# Patient Record
Sex: MALE | Race: WHITE | NOT HISPANIC OR LATINO | Employment: FULL TIME | URBAN - METROPOLITAN AREA
[De-identification: names, ages, dates, MRNs, and addresses within clinical notes are randomized per-mention and may not be internally consistent; named-entity substitution may affect disease eponyms.]

---

## 2023-02-21 ENCOUNTER — OFFICE VISIT (OUTPATIENT)
Dept: URGENT CARE | Facility: CLINIC | Age: 48
End: 2023-02-21

## 2023-02-21 VITALS
WEIGHT: 277 LBS | TEMPERATURE: 98.5 F | OXYGEN SATURATION: 95 % | HEART RATE: 84 BPM | RESPIRATION RATE: 20 BRPM | DIASTOLIC BLOOD PRESSURE: 85 MMHG | HEIGHT: 71 IN | BODY MASS INDEX: 38.78 KG/M2 | SYSTOLIC BLOOD PRESSURE: 130 MMHG

## 2023-02-21 DIAGNOSIS — R05.1 ACUTE COUGH: ICD-10-CM

## 2023-02-21 DIAGNOSIS — H61.23 BILATERAL IMPACTED CERUMEN: ICD-10-CM

## 2023-02-21 DIAGNOSIS — J02.9 SORE THROAT: ICD-10-CM

## 2023-02-21 DIAGNOSIS — J06.9 UPPER RESPIRATORY INFECTION WITH COUGH AND CONGESTION: Primary | ICD-10-CM

## 2023-02-21 DIAGNOSIS — Z09 NEED FOR FOLLOW-UP CARE AFTER DISCHARGE: ICD-10-CM

## 2023-02-21 LAB — S PYO AG THROAT QL: NEGATIVE

## 2023-02-21 RX ORDER — AZITHROMYCIN 250 MG/1
TABLET, FILM COATED ORAL
Qty: 6 TABLET | Refills: 0 | Status: SHIPPED | OUTPATIENT
Start: 2023-02-21 | End: 2023-02-25

## 2023-02-21 RX ORDER — LANSOPRAZOLE 30 MG/1
30 CAPSULE, DELAYED RELEASE ORAL DAILY PRN
COMMUNITY

## 2023-02-21 RX ORDER — CETIRIZINE HYDROCHLORIDE 10 MG/1
10 TABLET ORAL DAILY
COMMUNITY

## 2023-02-21 RX ORDER — FLUTICASONE PROPIONATE 50 MCG
1 SPRAY, SUSPENSION (ML) NASAL DAILY
Qty: 9 ML | Refills: 0 | Status: SHIPPED | OUTPATIENT
Start: 2023-02-21

## 2023-02-21 NOTE — PROGRESS NOTES
[facility]        NAME: Shannon Barney is a 52 y o  male  : 1975    MRN: [id number]  DATE: 2023  TIME: 12:22 PM    Assessment and Plan   Upper respiratory infection with cough and congestion [J06 9]  1  Upper respiratory infection with cough and congestion  azithromycin (ZITHROMAX) 250 mg tablet    fluticasone (FLONASE) 50 mcg/act nasal spray    Ambulatory Referral to [facility]      2  Sore throat  POCT rapid strepA    Throat culture    azithromycin (ZITHROMAX) 250 mg tablet    fluticasone (FLONASE) 50 mcg/act nasal spray    Ambulatory Referral to [facility]      3  Acute cough  Cov/Flu-Collected at [facility]    azithromycin (ZITHROMAX) 250 mg tablet    fluticasone (FLONASE) 50 mcg/act nasal spray    Ambulatory Referral to [facility]      4  Bilateral impacted cerumen  Ambulatory Referral to [facility]      5  Need for follow-up care after discharge  Ambulatory Referral to [facility]            Patient Instructions       Follow up with PCP in 3-5 days  Proceed to  ER if symptoms worsen  Your strep A is negative  You have a throat culture pending  You are to download Crzyfish for the results in 3-4 days  You will be notified if the results are + Azithromycin will cover the strep  You are to do warm salt water gargles 4 x daily  Drink warm tea with honey and lemon  Take tylenol or motrin as able for pain or fever  Chloraseptic throat spray, cough drops  Do not share utensils  Change your tooth brush in 3 days  Follow up with your PCP in 2-3 days  Go to the ED if symptoms worsen      You have an upper respiratory infection with cough  The azithromycin will help loosen up congestion  You are to drink plenty of water  Take Robitussin CF - during the day  Nyquil only at night  Use the flonase for nasal congestion  You have a flu/covid test pending  You are to download urturnt for the results in 24 - 48 hours   You will be notified if abnormal      You have ear wax in both of your ears  You are to see your family doctor for removal or see ENT  You may try the Debrox ear removal system OTC and then flush with a blue bulb syringe with warm water and a little peroxide  Do one ear at a time and complete before starting the other  Chief Complaint     Chief Complaint   Patient presents with   • Sore Throat     Started Sunday, along with a runny nose  • Cough     Trying to bring up phlegm  Has a bad gag reflex  History of Present Illness       This is a 52year old male who states developed a sorethroat and runny nose on Sunday and now has chest congestion  He states he is not able to expectorate mucous  He has had clear runny nose  He denies fevers, chills, n/v/d  He has been taking nyquil only for cough  He denies exposure to strep  He is covid vaccinated but not flu  Review of Systems   Review of Systems   Constitutional: Negative  HENT: Positive for congestion, rhinorrhea and sore throat  Eyes: Negative  Respiratory: Positive for cough  Cardiovascular: Negative  Gastrointestinal: Negative  Endocrine: Negative  Genitourinary: Negative  Musculoskeletal: Negative  Skin: Negative  Allergic/Immunologic: Negative  Neurological: Negative  Hematological: Negative  Psychiatric/Behavioral: Negative            Current Medications       Current Outpatient Medications:   •  azithromycin (ZITHROMAX) 250 mg tablet, Take 2 tablets today then 1 tablet daily x 4 days, Disp: 6 tablet, Rfl: 0  •  cetirizine (ZyrTEC) 10 mg tablet, Take 10 mg by mouth daily, Disp: , Rfl:   •  fluticasone (FLONASE) 50 mcg/act nasal spray, 1 spray into each nostril daily, Disp: 9 mL, Rfl: 0  •  lansoprazole (PREVACID) 30 mg capsule, Take 30 mg by mouth daily as needed, Disp: , Rfl:     Current Allergies     Allergies as of 02/21/2023   • (No Known Allergies)            The following portions of the patient's history were reviewed and updated as appropriate: allergies, current medications, past family history, past medical history, past social history, past surgical history and problem list      History reviewed  No pertinent past medical history  History reviewed  No pertinent surgical history  History reviewed  No pertinent family history  Medications have been verified  Objective   /85 (BP Location: Left arm, Patient Position: Sitting)   Pulse 84   Temp 98 5 °F (36 9 °C) (Temporal)   Resp 20   Ht 5' 11" (1 803 m)   Wt 126 kg (277 lb)   SpO2 95%   BMI 38 63 kg/m²   No LMP for male patient  Physical Exam     Physical Exam  Vitals and nursing note reviewed  Constitutional:       General: He is not in acute distress  Appearance: He is well-developed  He is obese  He is not ill-appearing, toxic-appearing or diaphoretic  HENT:      Head: Normocephalic and atraumatic  Ears:      Comments: Bilateral cerumen impaction      Nose: Rhinorrhea present  No congestion  Mouth/Throat:      Mouth: Mucous membranes are moist  No oral lesions  Pharynx: Oropharynx is clear  Uvula midline  Posterior oropharyngeal erythema present  No pharyngeal swelling, oropharyngeal exudate or uvula swelling  Tonsils: No tonsillar exudate or tonsillar abscesses  1+ on the right  1+ on the left  Eyes:      Extraocular Movements:      Right eye: Normal extraocular motion  Left eye: Normal extraocular motion  Cardiovascular:      Rate and Rhythm: Normal rate and regular rhythm  Heart sounds: Normal heart sounds  No murmur heard  Pulmonary:      Effort: Pulmonary effort is normal       Comments: Course rhonchi with coughing   Wet upper airway cough   Musculoskeletal:      Cervical back: Normal range of motion and neck supple  Lymphadenopathy:      Cervical: No cervical adenopathy  Skin:     General: Skin is warm and dry        Capillary Refill: Capillary refill takes less than 2 seconds  Neurological:      General: No focal deficit present  Mental Status: He is alert and oriented to person, place, and time     Psychiatric:         Mood and Affect: Mood normal          Behavior: Behavior normal

## 2023-02-21 NOTE — PATIENT INSTRUCTIONS
Your strep A is negative  You have a throat culture pending  You are to download SL mychart for the results in 3-4 days  You will be notified if the results are + Azithromycin will cover the strep  You are to do warm salt water gargles 4 x daily  Drink warm tea with honey and lemon  Take tylenol or motrin as able for pain or fever  Chloraseptic throat spray, cough drops  Do not share utensils  Change your tooth brush in 3 days  Follow up with your PCP in 2-3 days  Go to the ED if symptoms worsen      You have an upper respiratory infection with cough  The azithromycin will help loosen up congestion  You are to drink plenty of water  Take Robitussin CF - during the day  Nyquil only at night  Use the flonase for nasal congestion  You have a flu/covid test pending  You are to download SL mychart for the results in 24 - 48 hours  You will be notified if abnormal      You have ear wax in both of your ears  You are to see your family doctor for removal or see ENT  You may try the Debrox ear removal system OTC and then flush with a blue bulb syringe with warm water and a little peroxide  Do one ear at a time and complete before starting the other

## 2023-02-22 LAB
FLUAV RNA RESP QL NAA+PROBE: NEGATIVE
FLUBV RNA RESP QL NAA+PROBE: NEGATIVE
SARS-COV-2 RNA RESP QL NAA+PROBE: NEGATIVE

## 2023-02-23 LAB — BACTERIA THROAT CULT: NORMAL

## 2023-10-16 ENCOUNTER — OCCMED (OUTPATIENT)
Dept: URGENT CARE | Facility: CLINIC | Age: 48
End: 2023-10-16
Payer: OTHER MISCELLANEOUS

## 2023-10-16 ENCOUNTER — APPOINTMENT (OUTPATIENT)
Dept: RADIOLOGY | Facility: CLINIC | Age: 48
End: 2023-10-16
Payer: OTHER MISCELLANEOUS

## 2023-10-16 DIAGNOSIS — S91.332A PUNCTURE WOUND OF LEFT FOOT, INITIAL ENCOUNTER: ICD-10-CM

## 2023-10-16 DIAGNOSIS — S91.332A PUNCTURE WOUND OF LEFT FOOT, INITIAL ENCOUNTER: Primary | ICD-10-CM

## 2023-10-16 DIAGNOSIS — S91.332A NAIL WOUND OF FOOT, LEFT, INITIAL ENCOUNTER: ICD-10-CM

## 2023-10-16 PROCEDURE — 73630 X-RAY EXAM OF FOOT: CPT

## 2023-10-16 PROCEDURE — G0383 LEV 4 HOSP TYPE B ED VISIT: HCPCS | Performed by: NURSE PRACTITIONER

## 2023-10-16 PROCEDURE — 99284 EMERGENCY DEPT VISIT MOD MDM: CPT | Performed by: NURSE PRACTITIONER

## 2023-10-16 PROCEDURE — 90715 TDAP VACCINE 7 YRS/> IM: CPT

## 2023-10-16 PROCEDURE — 90471 IMMUNIZATION ADMIN: CPT | Performed by: NURSE PRACTITIONER

## 2023-10-19 ENCOUNTER — OCCMED (OUTPATIENT)
Dept: URGENT CARE | Facility: CLINIC | Age: 48
End: 2023-10-19
Payer: OTHER MISCELLANEOUS

## 2023-10-19 DIAGNOSIS — S91.332S: Primary | ICD-10-CM

## 2023-10-19 DIAGNOSIS — S91.332A NAIL WOUND OF FOOT, LEFT, INITIAL ENCOUNTER: ICD-10-CM

## 2023-10-19 PROCEDURE — 99214 OFFICE O/P EST MOD 30 MIN: CPT | Performed by: NURSE PRACTITIONER

## 2023-10-27 ENCOUNTER — OCCMED (OUTPATIENT)
Dept: URGENT CARE | Facility: CLINIC | Age: 48
End: 2023-10-27
Payer: OTHER MISCELLANEOUS

## 2023-10-27 DIAGNOSIS — S91.332D: Primary | ICD-10-CM

## 2023-10-27 DIAGNOSIS — S91.332A NAIL WOUND OF FOOT, LEFT, INITIAL ENCOUNTER: ICD-10-CM

## 2023-10-27 PROCEDURE — 99213 OFFICE O/P EST LOW 20 MIN: CPT | Performed by: NURSE PRACTITIONER

## 2023-12-07 ENCOUNTER — OFFICE VISIT (OUTPATIENT)
Dept: FAMILY MEDICINE CLINIC | Facility: CLINIC | Age: 48
End: 2023-12-07
Payer: COMMERCIAL

## 2023-12-07 VITALS
SYSTOLIC BLOOD PRESSURE: 142 MMHG | HEIGHT: 70 IN | DIASTOLIC BLOOD PRESSURE: 88 MMHG | WEIGHT: 282.2 LBS | BODY MASS INDEX: 40.4 KG/M2 | TEMPERATURE: 97.8 F

## 2023-12-07 DIAGNOSIS — G89.29 CHRONIC BACK PAIN, UNSPECIFIED BACK LOCATION, UNSPECIFIED BACK PAIN LATERALITY: ICD-10-CM

## 2023-12-07 DIAGNOSIS — R07.81 RIB PAIN ON LEFT SIDE: ICD-10-CM

## 2023-12-07 DIAGNOSIS — R35.0 URINARY FREQUENCY: ICD-10-CM

## 2023-12-07 DIAGNOSIS — Z23 ENCOUNTER FOR IMMUNIZATION: ICD-10-CM

## 2023-12-07 DIAGNOSIS — M25.511 ACUTE PAIN OF RIGHT SHOULDER: ICD-10-CM

## 2023-12-07 DIAGNOSIS — R03.0 ELEVATED BP WITHOUT DIAGNOSIS OF HYPERTENSION: ICD-10-CM

## 2023-12-07 DIAGNOSIS — Z89.231 RIGHT SHOULDER AMPUTEE: ICD-10-CM

## 2023-12-07 DIAGNOSIS — Z76.89 ESTABLISHING CARE WITH NEW DOCTOR, ENCOUNTER FOR: Primary | ICD-10-CM

## 2023-12-07 DIAGNOSIS — M54.9 CHRONIC BACK PAIN, UNSPECIFIED BACK LOCATION, UNSPECIFIED BACK PAIN LATERALITY: ICD-10-CM

## 2023-12-07 DIAGNOSIS — M79.645 PAIN OF FINGER OF LEFT HAND: ICD-10-CM

## 2023-12-07 DIAGNOSIS — Z13.29 SCREENING FOR THYROID DISORDER: ICD-10-CM

## 2023-12-07 DIAGNOSIS — Z13.220 SCREENING FOR LIPID DISORDERS: ICD-10-CM

## 2023-12-07 DIAGNOSIS — Z13.1 SCREENING FOR DIABETES MELLITUS: ICD-10-CM

## 2023-12-07 DIAGNOSIS — Z11.59 NEED FOR HEPATITIS C SCREENING TEST: ICD-10-CM

## 2023-12-07 DIAGNOSIS — Z11.4 SCREENING FOR HIV (HUMAN IMMUNODEFICIENCY VIRUS): ICD-10-CM

## 2023-12-07 PROBLEM — K21.9 GASTROESOPHAGEAL REFLUX DISEASE WITHOUT ESOPHAGITIS: Status: ACTIVE | Noted: 2023-12-07

## 2023-12-07 PROBLEM — J30.2 SEASONAL ALLERGIES: Status: ACTIVE | Noted: 2023-12-07

## 2023-12-07 PROBLEM — Z87.828 HISTORY OF TEAR OF ACL (ANTERIOR CRUCIATE LIGAMENT): Status: ACTIVE | Noted: 2023-12-07

## 2023-12-07 PROCEDURE — 99204 OFFICE O/P NEW MOD 45 MIN: CPT | Performed by: STUDENT IN AN ORGANIZED HEALTH CARE EDUCATION/TRAINING PROGRAM

## 2023-12-07 NOTE — PROGRESS NOTES
Name: Ivelisse Mackey      : 1975      MRN: [id number]  Encounter Provider: Kaci Hanna MD  Encounter Date: 2023   Encounter department: [address] [place] [address]     1. Establishing care with new doctor, encounter for    2. Need for hepatitis C screening test  -     Hepatitis C Antibody; Future    3. Screening for HIV (human immunodeficiency virus)  -     HIV 1/2 AG/AB w Reflex [facility] for 2 yr old and above; Future    4. Encounter for immunization  -     TDAP VACCINE GREATER THAN OR EQUAL TO 6YO IM    5. BMI 38.0-38.9,adult  -     Comprehensive metabolic panel; Future  -     Lipid Panel with Direct LDL reflex; Future  -     Hemoglobin A1C; Future  -     TSH, 3rd generation with Free T4 reflex; Future  -     CBC and differential; Future    6. Screening for lipid disorders  -     Lipid Panel with Direct LDL reflex; Future    7. Screening for diabetes mellitus  -     Hemoglobin A1C; Future  -     CBC and differential; Future    8. Screening for thyroid disorder  -     TSH, 3rd generation with Free T4 reflex; Future  -     CBC and differential; Future    9. Elevated BP without diagnosis of hypertension  -     Albumin / creatinine urine ratio; Future    10. Rib pain on left side    11. Chronic back pain, unspecified back location, unspecified back pain laterality  -     Ambulatory Referral to Physical Therapy; Future    12. Right shoulder amputee    13. Urinary frequency  -     PSA, Total Screen; Future  -     Testosterone, free, total; Future    14. Pain of finger of left hand  -     XR hand 3+ vw left; Future; Expected date: 2023    15. Acute pain of right shoulder  -     XR shoulder 2+ vw right; Future; Expected date: 2023      BMI Counseling: Body mass index is 40.49 kg/m².  The BMI is above normal. Nutrition recommendations include decreasing portion sizes, encouraging healthy choices of fruits and vegetables, decreasing fast food intake, moderation in carbohydrate intake, increasing intake of lean protein and reducing intake of saturated and trans fat. Exercise recommendations include moderate physical activity 150 minutes/week, obtaining a gym membership and strength training exercises. No pharmacotherapy was ordered. Rationale for BMI follow-up plan is due to patient being overweight or obese. Depression Screening and Follow-up Plan: Patient was screened for depression during today's encounter. They screened negative with a PHQ-2 score of 0. Subjective      HPI    Is a 26-year-old male presents to the office as new patient and for establishment of care. Patient states he has not seen a physician in several years recently retired and moved to this area. 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 He reports multiple issues that he would like to discuss including increased urination pain in the left hand pain on the left rib cage pain in the low back pain in the right shoulder. He states he is otherwise healthy. Review of Systems   Constitutional:  Negative for activity change, appetite change, chills, fatigue and fever. HENT:  Negative for congestion, dental problem, drooling, ear discharge, ear pain, facial swelling, postnasal drip, rhinorrhea and sinus pain. Eyes:  Negative for photophobia, pain, discharge and itching. Respiratory:  Negative for apnea, cough, chest tightness and shortness of breath. Cardiovascular:  Negative for chest pain and leg swelling. Gastrointestinal:  Negative for abdominal distention, abdominal pain, anal bleeding, constipation, diarrhea and nausea. Endocrine: Negative for cold intolerance, heat intolerance and polydipsia. Genitourinary:  Negative for difficulty urinating. Musculoskeletal:  Positive for arthralgias and back pain. Negative for gait problem, joint swelling and myalgias. Right shoulder pain, left third digit pain. Skin:  Negative for color change and pallor. Allergic/Immunologic: Negative for immunocompromised state. Neurological:  Negative for dizziness, seizures, facial asymmetry, weakness, light-headedness, numbness and headaches. Psychiatric/Behavioral:  Negative for agitation, behavioral problems, confusion, decreased concentration and dysphoric mood. All other systems reviewed and are negative. Current Outpatient Medications on File Prior to Visit   Medication Sig    cetirizine (ZyrTEC) 10 mg tablet Take 10 mg by mouth daily    lansoprazole (PREVACID) 30 mg capsule Take 30 mg by mouth daily as needed    [DISCONTINUED] fluticasone (FLONASE) 50 mcg/act nasal spray 1 spray into each nostril daily       Objective     /88 (BP Location: Left arm, Patient Position: Sitting, Cuff Size: Large)   Temp 97.8 °F (36.6 °C) (Tympanic)   Ht 5' 10" (1.778 m)   Wt 128 kg (282 lb 3.2 oz)   BMI 40.49 kg/m²     Physical Exam  Vitals and nursing note reviewed. Constitutional:       Appearance: He is well-developed. HENT:      Head: Normocephalic and atraumatic. Eyes:      Conjunctiva/sclera: Conjunctivae normal.      Pupils: Pupils are equal, round, and reactive to light. Neck:      Thyroid: No thyromegaly. Vascular: No JVD. Trachea: No tracheal deviation. Cardiovascular:      Rate and Rhythm: Normal rate and regular rhythm. Pulmonary:      Effort: Pulmonary effort is normal.      Breath sounds: Normal breath sounds. Abdominal:      General: Bowel sounds are normal.      Palpations: Abdomen is soft. Musculoskeletal:         General: No tenderness or deformity. Normal range of motion. Cervical back: Normal range of motion and neck supple. Comments: Left third digit tendon sheath cyst noted no obvious trigger finger. Right shoulder full range of motion negative internal/external abnormalities full range of motion with horizontal local arc. Lymphadenopathy:      Cervical: No cervical adenopathy.    Skin:     General: Skin is warm and dry. Capillary Refill: Capillary refill takes less than 2 seconds. Neurological:      Mental Status: He is alert and oriented to person, place, and time. Cranial Nerves: No cranial nerve deficit.       Coordination: Coordination normal.      Deep Tendon Reflexes: Reflexes normal.   Psychiatric:         Behavior: Behavior normal.       Digna Gilmore MD

## 2023-12-08 ENCOUNTER — APPOINTMENT (OUTPATIENT)
Age: 48
End: 2023-12-08
Payer: COMMERCIAL

## 2023-12-08 DIAGNOSIS — Z11.59 NEED FOR HEPATITIS C SCREENING TEST: ICD-10-CM

## 2023-12-08 DIAGNOSIS — R35.0 URINARY FREQUENCY: ICD-10-CM

## 2023-12-08 DIAGNOSIS — Z13.29 SCREENING FOR THYROID DISORDER: ICD-10-CM

## 2023-12-08 DIAGNOSIS — R03.0 ELEVATED BP WITHOUT DIAGNOSIS OF HYPERTENSION: ICD-10-CM

## 2023-12-08 DIAGNOSIS — Z13.220 SCREENING FOR LIPID DISORDERS: ICD-10-CM

## 2023-12-08 DIAGNOSIS — Z13.1 SCREENING FOR DIABETES MELLITUS: ICD-10-CM

## 2023-12-08 DIAGNOSIS — Z11.4 SCREENING FOR HIV (HUMAN IMMUNODEFICIENCY VIRUS): ICD-10-CM

## 2023-12-08 LAB
ALBUMIN SERPL BCP-MCNC: 4.2 G/DL (ref 3.5–5)
ALP SERPL-CCNC: 56 U/L (ref 34–104)
ALT SERPL W P-5'-P-CCNC: 26 U/L (ref 7–52)
ANION GAP SERPL CALCULATED.3IONS-SCNC: 10 MMOL/L
AST SERPL W P-5'-P-CCNC: 24 U/L (ref 13–39)
BASOPHILS # BLD AUTO: 0.08 THOUSANDS/ÂΜL (ref 0–0.1)
BASOPHILS NFR BLD AUTO: 1 % (ref 0–1)
BILIRUB SERPL-MCNC: 0.51 MG/DL (ref 0.2–1)
BUN SERPL-MCNC: 16 MG/DL (ref 5–25)
CALCIUM SERPL-MCNC: 9 MG/DL (ref 8.4–10.2)
CHLORIDE SERPL-SCNC: 104 MMOL/L (ref 96–108)
CHOLEST SERPL-MCNC: 206 MG/DL
CO2 SERPL-SCNC: 27 MMOL/L (ref 21–32)
CREAT SERPL-MCNC: 1 MG/DL (ref 0.6–1.3)
CREAT UR-MCNC: 237.3 MG/DL
EOSINOPHIL # BLD AUTO: 0.2 THOUSAND/ÂΜL (ref 0–0.61)
EOSINOPHIL NFR BLD AUTO: 3 % (ref 0–6)
ERYTHROCYTE [DISTWIDTH] IN BLOOD BY AUTOMATED COUNT: 13.2 % (ref 11.6–15.1)
GFR SERPL CREATININE-BSD FRML MDRD: 88 ML/MIN/1.73SQ M
GLUCOSE P FAST SERPL-MCNC: 95 MG/DL (ref 65–99)
HCT VFR BLD AUTO: 46.8 % (ref 36.5–49.3)
HCV AB SER QL: NORMAL
HDLC SERPL-MCNC: 43 MG/DL
HGB BLD-MCNC: 16.3 G/DL (ref 12–17)
HIV 1+2 AB+HIV1 P24 AG SERPL QL IA: NORMAL
HIV 2 AB SERPL QL IA: NORMAL
HIV1 AB SERPL QL IA: NORMAL
HIV1 P24 AG SERPL QL IA: NORMAL
IMM GRANULOCYTES # BLD AUTO: 0.02 THOUSAND/UL (ref 0–0.2)
IMM GRANULOCYTES NFR BLD AUTO: 0 % (ref 0–2)
LDLC SERPL CALC-MCNC: 134 MG/DL (ref 0–100)
LYMPHOCYTES # BLD AUTO: 1.6 THOUSANDS/ÂΜL (ref 0.6–4.47)
LYMPHOCYTES NFR BLD AUTO: 25 % (ref 14–44)
MCH RBC QN AUTO: 30.5 PG (ref 26.8–34.3)
MCHC RBC AUTO-ENTMCNC: 34.8 G/DL (ref 31.4–37.4)
MCV RBC AUTO: 88 FL (ref 82–98)
MICROALBUMIN UR-MCNC: 11.9 MG/L
MICROALBUMIN/CREAT 24H UR: 5 MG/G CREATININE (ref 0–30)
MONOCYTES # BLD AUTO: 0.59 THOUSAND/ÂΜL (ref 0.17–1.22)
MONOCYTES NFR BLD AUTO: 9 % (ref 4–12)
NEUTROPHILS # BLD AUTO: 3.91 THOUSANDS/ÂΜL (ref 1.85–7.62)
NEUTS SEG NFR BLD AUTO: 62 % (ref 43–75)
NRBC BLD AUTO-RTO: 0 /100 WBCS
PLATELET # BLD AUTO: 231 THOUSANDS/UL (ref 149–390)
PMV BLD AUTO: 9.5 FL (ref 8.9–12.7)
POTASSIUM SERPL-SCNC: 4.6 MMOL/L (ref 3.5–5.3)
PROT SERPL-MCNC: 6.6 G/DL (ref 6.4–8.4)
PSA SERPL-MCNC: 0.32 NG/ML (ref 0–4)
RBC # BLD AUTO: 5.34 MILLION/UL (ref 3.88–5.62)
SODIUM SERPL-SCNC: 141 MMOL/L (ref 135–147)
TRIGL SERPL-MCNC: 144 MG/DL
TSH SERPL DL<=0.05 MIU/L-ACNC: 2.23 UIU/ML (ref 0.45–4.5)
WBC # BLD AUTO: 6.4 THOUSAND/UL (ref 4.31–10.16)

## 2023-12-08 PROCEDURE — 86803 HEPATITIS C AB TEST: CPT

## 2023-12-08 PROCEDURE — 80053 COMPREHEN METABOLIC PANEL: CPT

## 2023-12-08 PROCEDURE — 80061 LIPID PANEL: CPT

## 2023-12-08 PROCEDURE — 85025 COMPLETE CBC W/AUTO DIFF WBC: CPT

## 2023-12-08 PROCEDURE — G0103 PSA SCREENING: HCPCS

## 2023-12-08 PROCEDURE — 36415 COLL VENOUS BLD VENIPUNCTURE: CPT

## 2023-12-08 PROCEDURE — 84403 ASSAY OF TOTAL TESTOSTERONE: CPT

## 2023-12-08 PROCEDURE — 87389 HIV-1 AG W/HIV-1&-2 AB AG IA: CPT

## 2023-12-08 PROCEDURE — 83036 HEMOGLOBIN GLYCOSYLATED A1C: CPT

## 2023-12-08 PROCEDURE — 84402 ASSAY OF FREE TESTOSTERONE: CPT

## 2023-12-08 PROCEDURE — 84443 ASSAY THYROID STIM HORMONE: CPT

## 2023-12-08 PROCEDURE — 82043 UR ALBUMIN QUANTITATIVE: CPT

## 2023-12-08 PROCEDURE — 82570 ASSAY OF URINE CREATININE: CPT

## 2023-12-09 LAB
EST. AVERAGE GLUCOSE BLD GHB EST-MCNC: 114 MG/DL
HBA1C MFR BLD: 5.6 %

## 2023-12-10 LAB
TESTOST FREE SERPL-MCNC: 14.5 PG/ML (ref 6.8–21.5)
TESTOST SERPL-MCNC: 283 NG/DL (ref 264–916)

## 2023-12-13 ENCOUNTER — APPOINTMENT (OUTPATIENT)
Dept: RADIOLOGY | Facility: CLINIC | Age: 48
End: 2023-12-13
Payer: COMMERCIAL

## 2023-12-13 DIAGNOSIS — M79.645 PAIN OF FINGER OF LEFT HAND: ICD-10-CM

## 2023-12-13 DIAGNOSIS — M25.511 ACUTE PAIN OF RIGHT SHOULDER: ICD-10-CM

## 2023-12-13 PROCEDURE — 73130 X-RAY EXAM OF HAND: CPT

## 2023-12-13 PROCEDURE — 73030 X-RAY EXAM OF SHOULDER: CPT

## 2024-02-12 ENCOUNTER — OFFICE VISIT (OUTPATIENT)
Dept: FAMILY MEDICINE CLINIC | Facility: CLINIC | Age: 49
End: 2024-02-12
Payer: COMMERCIAL

## 2024-02-12 VITALS
SYSTOLIC BLOOD PRESSURE: 138 MMHG | BODY MASS INDEX: 41.8 KG/M2 | DIASTOLIC BLOOD PRESSURE: 88 MMHG | HEIGHT: 70 IN | WEIGHT: 292 LBS | HEART RATE: 63 BPM | OXYGEN SATURATION: 98 %

## 2024-02-12 DIAGNOSIS — Z12.11 SCREENING FOR COLON CANCER: ICD-10-CM

## 2024-02-12 DIAGNOSIS — Z00.00 ANNUAL PHYSICAL EXAM: Primary | ICD-10-CM

## 2024-02-12 DIAGNOSIS — M19.011 LOCALIZED OSTEOARTHRITIS OF RIGHT SHOULDER: ICD-10-CM

## 2024-02-12 PROCEDURE — 99396 PREV VISIT EST AGE 40-64: CPT | Performed by: STUDENT IN AN ORGANIZED HEALTH CARE EDUCATION/TRAINING PROGRAM

## 2024-02-12 PROCEDURE — 20610 DRAIN/INJ JOINT/BURSA W/O US: CPT | Performed by: STUDENT IN AN ORGANIZED HEALTH CARE EDUCATION/TRAINING PROGRAM

## 2024-02-12 RX ORDER — LIDOCAINE HYDROCHLORIDE 10 MG/ML
4 INJECTION, SOLUTION INFILTRATION; PERINEURAL
Status: COMPLETED | OUTPATIENT
Start: 2024-02-12 | End: 2024-02-12

## 2024-02-12 RX ORDER — TRIAMCINOLONE ACETONIDE 40 MG/ML
40 INJECTION, SUSPENSION INTRA-ARTICULAR; INTRAMUSCULAR
Status: COMPLETED | OUTPATIENT
Start: 2024-02-12 | End: 2024-02-12

## 2024-02-12 RX ADMIN — TRIAMCINOLONE ACETONIDE 40 MG: 40 INJECTION, SUSPENSION INTRA-ARTICULAR; INTRAMUSCULAR at 15:00

## 2024-02-12 RX ADMIN — LIDOCAINE HYDROCHLORIDE 4 ML: 10 INJECTION, SOLUTION INFILTRATION; PERINEURAL at 15:00

## 2024-02-12 NOTE — PROGRESS NOTES
ADULT ANNUAL PHYSICAL  [facility]    NAME: Eloy Eden  AGE: 48 y.o. SEX: male  : 1975     DATE: 2024     Assessment and Plan:     Problem List Items Addressed This Visit    None  Visit Diagnoses       Annual physical exam    -  Primary    Screening for colon cancer        Relevant Orders    Ambulatory Referral to Gastroenterology    Localized osteoarthritis of right shoulder                Immunizations and preventive care screenings were discussed with patient today. Appropriate education was printed on patient's after visit summary.        Counseling:  Alcohol/drug use: discussed moderation in alcohol intake, the recommendations for healthy alcohol use, and avoidance of illicit drug use.  Dental Health: discussed importance of regular tooth brushing, flossing, and dental visits.  Injury prevention: discussed safety/seat belts, safety helmets, smoke detectors, carbon dioxide detectors, and smoking near bedding or upholstery.  Sexual health: discussed sexually transmitted diseases, partner selection, use of condoms, avoidance of unintended pregnancy, and contraceptive alternatives.  Exercise: the importance of regular exercise/physical activity was discussed. Recommend exercise 3-5 times per week for at least 30 minutes.          No follow-ups on file.     Chief Complaint:     Chief Complaint   Patient presents with    Physical Exam      History of Present Illness:     Adult Annual Physical   Patient here for a comprehensive physical exam. The patient reports no problems.    Diet and Physical Activity  Diet/Nutrition: well balanced diet.   Exercise: moderate cardiovascular exercise.      Depression Screening  PHQ-2/9 Depression Screening           General Health  Sleep: sleeps well.   Hearing: normal - bilateral.  Vision: no vision problems.   Dental: regular dental visits.        Health  Symptoms include: none    Advanced Care  Planning  Do you have an advanced directive? no  Do you have a durable medical power of ? no     Review of Systems:     Review of Systems   Constitutional:  Negative for activity change, appetite change, chills, fatigue and fever.   HENT:  Negative for congestion, dental problem, drooling, ear discharge, ear pain, facial swelling, postnasal drip, rhinorrhea and sinus pain.    Eyes:  Negative for photophobia, pain, discharge and itching.   Respiratory:  Negative for apnea, cough, chest tightness and shortness of breath.    Cardiovascular:  Negative for chest pain and leg swelling.   Gastrointestinal:  Negative for abdominal distention, abdominal pain, anal bleeding, constipation, diarrhea and nausea.   Endocrine: Negative for cold intolerance, heat intolerance and polydipsia.   Genitourinary:  Negative for difficulty urinating.   Musculoskeletal:  Negative for arthralgias, gait problem, joint swelling and myalgias.   Skin:  Negative for color change and pallor.   Allergic/Immunologic: Negative for immunocompromised state.   Neurological:  Negative for dizziness, seizures, facial asymmetry, weakness, light-headedness, numbness and headaches.   Psychiatric/Behavioral:  Negative for agitation, behavioral problems, confusion, decreased concentration and dysphoric mood.    All other systems reviewed and are negative.     Past Medical History:     History reviewed. No pertinent past medical history.   Past Surgical History:     History reviewed. No pertinent surgical history.   Family History:     History reviewed. No pertinent family history.   Social History:     Social History     Socioeconomic History    Marital status: /Civil Union     Spouse name: None    Number of children: None    Years of education: None    Highest education level: None   Occupational History    None   Tobacco Use    Smoking status: Never    Smokeless tobacco: Never   Vaping Use    Vaping status: Never Used   Substance and Sexual  "Activity    Alcohol use: Yes    Drug use: Never    Sexual activity: None   Other Topics Concern    None   Social History Narrative    None     Social Determinants of Health     Financial Resource Strain: Not on file   Food Insecurity: Not on file   Transportation Needs: Not on file   Physical Activity: Not on file   Stress: Not on file   Social Connections: Not on file   Intimate Partner Violence: Not on file   Housing Stability: Not on file      Current Medications:     Current Outpatient Medications   Medication Sig Dispense Refill    cetirizine (ZyrTEC) 10 mg tablet Take 10 mg by mouth daily      lansoprazole (PREVACID) 30 mg capsule Take 30 mg by mouth daily as needed       No current facility-administered medications for this visit.      Allergies:     No Known Allergies   Physical Exam:     /88 (BP Location: Left arm, Patient Position: Sitting, Cuff Size: Adult)   Pulse 63   Ht 5' 10\" (1.778 m)   Wt 132 kg (292 lb)   SpO2 98%   BMI 41.90 kg/m²     Physical Exam  Constitutional:       Appearance: He is well-developed.   HENT:      Head: Normocephalic.   Eyes:      Pupils: Pupils are equal, round, and reactive to light.   Cardiovascular:      Rate and Rhythm: Normal rate and regular rhythm.   Pulmonary:      Effort: Pulmonary effort is normal.      Breath sounds: Normal breath sounds.   Abdominal:      General: Bowel sounds are normal.      Palpations: Abdomen is soft.   Musculoskeletal:         General: Normal range of motion.      Cervical back: Normal range of motion and neck supple.   Skin:     General: Skin is warm.          Large joint arthrocentesis: R glenohumeral  Jelm Protocol:  Consent: Verbal consent obtained.  Risks and benefits: risks, benefits and alternatives were discussed  Time out: Immediately prior to procedure a \"time out\" was called to verify the correct patient, procedure, equipment, support staff and site/side marked as required.  Patient understanding: patient states " understanding of the procedure being performed  Patient consent: the patient's understanding of the procedure matches consent given  Procedure consent: procedure consent matches procedure scheduled  Relevant documents: relevant documents present and verified  Test results: test results available and properly labeled  Site marked: the operative site was marked  Radiology Images displayed and confirmed. If images not available, report reviewed: imaging studies available  Required items: required blood products, implants, devices, and special equipment available  Patient identity confirmed: verbally with patient  Supporting Documentation  Indications: pain and joint swelling   Procedure Details  Location: shoulder - R glenohumeral  Preparation: Patient was prepped and draped in the usual sterile fashion  Needle size: 25 G  Medications administered: 4 mL lidocaine 1 %; 40 mg triamcinolone acetonide 40 mg/mL    Dressing:  Sterile dressing applied            Terrance Rutherford MD  [facility]

## 2024-02-29 ENCOUNTER — TELEPHONE (OUTPATIENT)
Age: 49
End: 2024-02-29

## 2024-02-29 ENCOUNTER — PATIENT MESSAGE (OUTPATIENT)
Age: 49
End: 2024-02-29

## 2024-02-29 NOTE — TELEPHONE ENCOUNTER
02/29/24  Screened by: Leeanna Mobley    Referring Provider Dr. Rutherford    Pre- Screening:     There is no height or weight on file to calculate BMI.  Has patient been referred for a routine screening Colonoscopy? yes  Is the patient between 45-75 years old? yes    BMI - 41.90    Previous Colonoscopy no   If yes:    Date:     Facility:     Reason:           Does the patient want to see a Gastroenterologist prior to their procedure OR are they having any GI symptoms? no    Has the patient been hospitalized or had abdominal surgery in the past 6 months? no    Does the patient use supplemental oxygen? no    Does the patient take Coumadin, Lovenox, Plavix, Elliquis, Xarelto, or other blood thinning medication? no    Has the patient had a stroke, cardiac event, or stent placed in the past year? no         If patient is between 45yrs - 49yrs, please advise patient that we will have to confirm benefits & coverage with their insurance company for a routine screening colonoscopy.     pt. is  a 36 y/o female with c/o abdominal pain radiating to  the back x 20 days

## 2024-02-29 NOTE — TELEPHONE ENCOUNTER
Scheduled date of colonoscopy (as of today): Monday 4/1/2024  Physician performing colonoscopy: DR ARRIOLA  Location of colonoscopy: [facility]   Bowel prep reviewed with patient: MIRALAX / DULCOLAX  Instructions reviewed with patient by: MARY LOU LINTON Sent via [employer]  Clearances: N/A

## 2024-03-05 ENCOUNTER — OFFICE VISIT (OUTPATIENT)
Dept: URGENT CARE | Facility: CLINIC | Age: 49
End: 2024-03-05
Payer: COMMERCIAL

## 2024-03-05 VITALS
OXYGEN SATURATION: 97 % | DIASTOLIC BLOOD PRESSURE: 79 MMHG | HEIGHT: 71 IN | BODY MASS INDEX: 40.32 KG/M2 | WEIGHT: 288 LBS | RESPIRATION RATE: 20 BRPM | TEMPERATURE: 98 F | HEART RATE: 72 BPM | SYSTOLIC BLOOD PRESSURE: 130 MMHG

## 2024-03-05 DIAGNOSIS — J20.9 ACUTE BRONCHITIS, UNSPECIFIED ORGANISM: Primary | ICD-10-CM

## 2024-03-05 PROCEDURE — 99213 OFFICE O/P EST LOW 20 MIN: CPT | Performed by: PHYSICIAN ASSISTANT

## 2024-03-05 RX ORDER — AZITHROMYCIN 250 MG/1
TABLET, FILM COATED ORAL
Qty: 6 TABLET | Refills: 0 | Status: SHIPPED | OUTPATIENT
Start: 2024-03-05 | End: 2024-03-09

## 2024-03-05 NOTE — PROGRESS NOTES
[facility]    NAME: Eloy Eden is a 48 y.o. male  : 1975    MRN: [id number]  DATE: 2024  TIME: 4:02 PM    Assessment and Plan   Acute bronchitis, unspecified organism [J20.9]  1. Acute bronchitis, unspecified organism  azithromycin (ZITHROMAX) 250 mg tablet          Patient Instructions     Patient Instructions   I have prescribed an antibiotic for the infection.  Please take the antibiotic as prescribed and finish the entire prescription.  Take an over the counter probiotic or eat yogurt with live cultures in it (activia) to keep good bacteria in the gut and help prevent diarrhea.  Wash hands frequently to prevent the spread of infection.  Can use over the counter cough and cold medications to help with symptoms.  Ibuprofen and/or tylenol as needed for pain or fever.  If not improving over the next 7-10 days, follow up with PCP.          Chief Complaint     Chief Complaint   Patient presents with    Sore Throat     Had sore throat several days ago     Cough       History of Present Illness   48-year-old male here with complaint of sore throat and cough.  Chest congestion.  Symptoms started about 4 to 5 days ago.  Getting progressively worse.  Denies any shortness of breath.  Denies any fever.        Review of Systems   Review of Systems   Constitutional:  Negative for chills, fatigue and fever.   HENT:  Positive for sore throat. Negative for congestion, ear pain, postnasal drip and sinus pressure.    Respiratory:  Positive for cough and chest tightness. Negative for shortness of breath and wheezing.    Neurological:  Negative for headaches.   All other systems reviewed and are negative.      Current Medications     Current Outpatient Medications:     azithromycin (ZITHROMAX) 250 mg tablet, Take 2 tablets today then 1 tablet daily x 4 days, Disp: 6 tablet, Rfl: 0    cetirizine (ZyrTEC) 10 mg tablet, Take 10 mg by mouth daily, Disp: , Rfl:     lansoprazole (PREVACID) 30 mg capsule, Take  "30 mg by mouth daily as needed, Disp: , Rfl:     Current Allergies     Allergies as of 03/05/2024    (No Known Allergies)          The following portions of the patient's history were reviewed and updated as appropriate: allergies, current medications, past family history, past medical history, past social history, past surgical history and problem list.   History reviewed. No pertinent past medical history.  History reviewed. No pertinent surgical history.  No family history on file.  Social History     Socioeconomic History    Marital status: /Civil Union     Spouse name: Not on file    Number of children: Not on file    Years of education: Not on file    Highest education level: Not on file   Occupational History    Not on file   Tobacco Use    Smoking status: Never    Smokeless tobacco: Never   Vaping Use    Vaping status: Never Used   Substance and Sexual Activity    Alcohol use: Yes    Drug use: Never    Sexual activity: Not on file   Other Topics Concern    Not on file   Social History Narrative    Not on file     Social Determinants of Health     Financial Resource Strain: Not on file   Food Insecurity: Not on file   Transportation Needs: Not on file   Physical Activity: Not on file   Stress: Not on file   Social Connections: Not on file   Intimate Partner Violence: Not on file   Housing Stability: Not on file     Medications have been verified.    Objective   /79   Pulse 72   Temp 98 °F (36.7 °C)   Resp 20   Ht 5' 11\" (1.803 m)   Wt 131 kg (288 lb)   SpO2 97%   BMI 40.17 kg/m²      Physical Exam   Physical Exam  Vitals reviewed.   Constitutional:       General: He is not in acute distress.     Appearance: He is well-developed.   HENT:      Head: Normocephalic and atraumatic.      Right Ear: Tympanic membrane normal.      Left Ear: Tympanic membrane normal.      Nose: No mucosal edema.      Mouth/Throat:      Pharynx: Posterior oropharyngeal erythema present.   Cardiovascular:      Rate " and Rhythm: Normal rate and regular rhythm.      Heart sounds: Normal heart sounds.   Pulmonary:      Effort: Pulmonary effort is normal. No respiratory distress.      Breath sounds: Wheezing present.

## 2024-07-02 ENCOUNTER — VBI (OUTPATIENT)
Dept: ADMINISTRATIVE | Facility: OTHER | Age: 49
End: 2024-07-02

## 2024-07-02 NOTE — TELEPHONE ENCOUNTER
07/02/24 10:25 AM     Chart reviewed for CRC: Colonoscopy ; nothing is submitted to the patient's insurance at this time.     Cait Lundberg   [facility]

## 2024-08-06 ENCOUNTER — RA CDI HCC (OUTPATIENT)
Dept: OTHER | Facility: HOSPITAL | Age: 49
End: 2024-08-06

## 2024-08-13 ENCOUNTER — OFFICE VISIT (OUTPATIENT)
Dept: FAMILY MEDICINE CLINIC | Facility: CLINIC | Age: 49
End: 2024-08-13
Payer: COMMERCIAL

## 2024-08-13 VITALS
BODY MASS INDEX: 39.8 KG/M2 | DIASTOLIC BLOOD PRESSURE: 100 MMHG | TEMPERATURE: 98.9 F | SYSTOLIC BLOOD PRESSURE: 140 MMHG | WEIGHT: 278 LBS | HEIGHT: 70 IN | OXYGEN SATURATION: 95 % | HEART RATE: 79 BPM

## 2024-08-13 DIAGNOSIS — J30.2 SEASONAL ALLERGIES: ICD-10-CM

## 2024-08-13 DIAGNOSIS — N52.9 ERECTILE DYSFUNCTION, UNSPECIFIED ERECTILE DYSFUNCTION TYPE: ICD-10-CM

## 2024-08-13 DIAGNOSIS — K21.9 GASTROESOPHAGEAL REFLUX DISEASE WITHOUT ESOPHAGITIS: ICD-10-CM

## 2024-08-13 DIAGNOSIS — R39.11 BENIGN PROSTATIC HYPERPLASIA WITH URINARY HESITANCY: ICD-10-CM

## 2024-08-13 DIAGNOSIS — M25.511 ACUTE PAIN OF RIGHT SHOULDER: ICD-10-CM

## 2024-08-13 DIAGNOSIS — N40.1 BENIGN PROSTATIC HYPERPLASIA WITH URINARY HESITANCY: ICD-10-CM

## 2024-08-13 DIAGNOSIS — Z12.11 SCREENING FOR COLON CANCER: Primary | ICD-10-CM

## 2024-08-13 PROCEDURE — 99214 OFFICE O/P EST MOD 30 MIN: CPT | Performed by: STUDENT IN AN ORGANIZED HEALTH CARE EDUCATION/TRAINING PROGRAM

## 2024-08-13 NOTE — ASSESSMENT & PLAN NOTE
Previous PSA WNL.  Will begin Finasteride.  Discussed risks, benefits and side effects of medication.

## 2024-08-13 NOTE — ASSESSMENT & PLAN NOTE
Will begin Viagara.  Discussed risks, benefits, and side effects of the medication with the patient.

## 2024-08-13 NOTE — PROGRESS NOTES
Ambulatory Visit  Name: Eloy Eden      : 1975      MRN: [id number]  Encounter Provider: Terrance Rutherford MD  Encounter Date: 2024   Encounter department: [facility] PRIMARY [facility]    Assessment & Plan   1. Screening for colon cancer  Assessment & Plan:  Placed order for Cologuard screening.   Orders:  -     Ambulatory Referral to Gastroenterology; Future  -     Cologuard  2. Seasonal allergies  Assessment & Plan:  Continue Zyrtec 10mg daily.  3. Gastroesophageal reflux disease without esophagitis  Assessment & Plan:  Continue Lansoprazole 30mg once daily as needed.  4. Acute pain of right shoulder  Assessment & Plan:  Will proceed with MRI of right shoulder given failure of conservative therapy.   Orders:  -     PSA, total and free; Future  -     MRI shoulder right wo contrast; Future; Expected date: 2024  5. Benign prostatic hyperplasia with urinary hesitancy  Assessment & Plan:  Previous PSA WNL.  Will begin Finasteride.  Discussed risks, benefits and side effects of medication.  Orders:  -     finasteride (PROSCAR) 5 mg tablet; Take 1 tablet (5 mg total) by mouth daily  6. Erectile dysfunction, unspecified erectile dysfunction type  Assessment & Plan:  Will begin Viagara.  Discussed risks, benefits, and side effects of the medication with the patient.     Orders:  -     sildenafil (VIAGRA) 50 MG tablet; Take 1 tablet (50 mg total) by mouth daily as needed for erectile dysfunction       History of Present Illness     49 year old male with PMH of GERD and seasonal allergies presenting for a 6 month follow up visit. Admits to neck and shoulder pain. Has been going on for the past year. Constant and achy pain. Worsened with movement. Rates it 5/10. Has taken tylenol and motrin and it does not provide relief. Has tried an at home exercise/PT regimen and it did not provide relief. Has also tried steroid injections and it only provided 1 week of relief. Also reports difficulty urinating.  "Patient reports that he gets a sudden urge to urinate intermittently throughout the day. Also reports decreased stream and dribbling when he is urinating. Also reports that he has been having issues with maintaining an erection during sexual activity. No hx of prostate cancer in the family. No recent trauma to the shoulder.         Review of Systems   Constitutional:  Negative for appetite change, chills, diaphoresis and fever.   HENT:  Negative for congestion, ear pain, rhinorrhea, sinus pain and sore throat.    Eyes:  Negative for pain and visual disturbance.   Respiratory:  Negative for cough and shortness of breath.    Cardiovascular:  Negative for chest pain and palpitations.   Gastrointestinal:  Negative for abdominal distention, abdominal pain, diarrhea, nausea and vomiting.   Genitourinary:  Positive for difficulty urinating (Decreased stream and dribbling) and urgency. Negative for dysuria and hematuria.   Musculoskeletal:  Positive for myalgias (Right shoulder pain) and neck pain. Negative for arthralgias and back pain.   Skin:  Negative for color change and rash.   Neurological:  Negative for dizziness, seizures, syncope, weakness, light-headedness and numbness.   All other systems reviewed and are negative.    Pertinent Medical History         Medical History Reviewed by provider this encounter:       Objective     /100 (BP Location: Left arm, Patient Position: Sitting, Cuff Size: Large)   Pulse 79   Temp 98.9 °F (37.2 °C) (Tympanic)   Ht 5' 10\" (1.778 m)   Wt 126 kg (278 lb)   SpO2 95%   BMI 39.89 kg/m²     Physical Exam  Vitals and nursing note reviewed.   Constitutional:       General: He is not in acute distress.     Appearance: Normal appearance. He is well-developed.   HENT:      Head: Normocephalic and atraumatic.      Mouth/Throat:      Mouth: Mucous membranes are moist.   Eyes:      Conjunctiva/sclera: Conjunctivae normal.      Pupils: Pupils are equal, round, and reactive to light. "   Cardiovascular:      Rate and Rhythm: Normal rate and regular rhythm.      Pulses: Normal pulses.      Heart sounds: No murmur heard.  Pulmonary:      Effort: Pulmonary effort is normal. No respiratory distress.      Breath sounds: Normal breath sounds.   Abdominal:      General: Bowel sounds are normal. There is no distension.      Palpations: Abdomen is soft.      Tenderness: There is no abdominal tenderness.   Musculoskeletal:         General: No swelling.      Right shoulder: No tenderness, bony tenderness or crepitus. Normal range of motion. Normal strength.      Left shoulder: Normal. No tenderness, bony tenderness or crepitus. Normal range of motion. Normal strength.      Right upper arm: Normal.      Left upper arm: Normal.      Right elbow: Normal.      Left elbow: Normal.      Right forearm: Normal.      Left forearm: Normal.      Right wrist: Normal.      Left wrist: Normal.      Cervical back: Neck supple.      Comments: (+) Empty Can Sign (Right Shoulder), Pain with abduction of right shoulder   Skin:     General: Skin is warm and dry.      Capillary Refill: Capillary refill takes less than 2 seconds.   Neurological:      Mental Status: He is alert.   Psychiatric:         Mood and Affect: Mood normal.       Administrative Statements            H/O interstitial lung disease

## 2024-08-14 RX ORDER — FINASTERIDE 5 MG/1
5 TABLET, FILM COATED ORAL DAILY
Qty: 30 TABLET | Refills: 0 | Status: SHIPPED | OUTPATIENT
Start: 2024-08-14

## 2024-08-14 RX ORDER — SILDENAFIL 50 MG/1
50 TABLET, FILM COATED ORAL DAILY PRN
Qty: 10 TABLET | Refills: 0 | Status: SHIPPED | OUTPATIENT
Start: 2024-08-14

## 2024-09-10 LAB — COLOGUARD RESULT REPORTABLE: NEGATIVE

## 2024-09-12 PROBLEM — Z12.11 SCREENING FOR COLON CANCER: Status: RESOLVED | Noted: 2024-08-13 | Resolved: 2024-09-12

## 2024-09-19 ENCOUNTER — OFFICE VISIT (OUTPATIENT)
Dept: PHYSICAL THERAPY | Facility: CLINIC | Age: 49
End: 2024-09-19
Payer: COMMERCIAL

## 2024-09-19 VITALS — DIASTOLIC BLOOD PRESSURE: 90 MMHG | SYSTOLIC BLOOD PRESSURE: 145 MMHG

## 2024-09-19 DIAGNOSIS — G89.29 CHRONIC RIGHT SHOULDER PAIN: Primary | ICD-10-CM

## 2024-09-19 DIAGNOSIS — M25.511 CHRONIC RIGHT SHOULDER PAIN: Primary | ICD-10-CM

## 2024-09-19 DIAGNOSIS — M54.2 NECK PAIN: ICD-10-CM

## 2024-09-19 DIAGNOSIS — M54.12 CERVICAL RADICULOPATHY: ICD-10-CM

## 2024-09-19 PROCEDURE — 97140 MANUAL THERAPY 1/> REGIONS: CPT | Performed by: PHYSICAL THERAPIST

## 2024-09-19 PROCEDURE — 97162 PT EVAL MOD COMPLEX 30 MIN: CPT | Performed by: PHYSICAL THERAPIST

## 2024-09-19 NOTE — PROGRESS NOTES
PT Evaluation     Today's date: 2024  Patient name: Eloy Eden  : 1975  MRN: [id number]  Referring provider: Terrance Rutherford MD  Dx:   Encounter Diagnosis     ICD-10-CM    1. Chronic right shoulder pain  M25.511     G89.29       2. Neck pain  M54.2       3. Cervical radiculopathy  M54.12                      Assessment  Impairments: abnormal or restricted ROM, abnormal movement, activity intolerance, impaired physical strength, lacks appropriate home exercise program, pain with function, poor posture , participation limitations and activity limitations  Functional limitations: interrupted sleep, weakness in R UE with lifting activity, unable to throw with R UE, limited functional reaching behind the back and behind the head    Assessment details: Eloy Eden is a 49 y.o. male who presents with complaints of chronic R shoulder pain and chronic neck pain. New onset R elbow pain and weakness with elbow flexion. Patient presents with (+) Spurlings on R and decreased sensation and decreased strength along proximal R UE, indicating cervical radiculopathy.  No further referral appears necessary at this time based upon examination results.  Etiologic factors include repetitive poor body mechanics and heavy lifting with work related activity. Prognosis is good given HEP compliance and PT 2x/wk.  Please contact me if you have any questions or recommendations.  Thank you for the opportunity to share in  Eloy's care.     Understanding of Dx/Px/POC: good     Prognosis: good    Goals  STGs  1) In 4 weeks patient will report 3 points reduced pain  2) In 4 weeks patient will demonstrate pain free cervical ROM in all directions without ROM limitations  3) In 4-6 weeks patient will demonstrate 1/2 grade improvement in R UE strength grossly    LTGs  1) In 8-12 weeks patient will report no interruption of sleep from pain  2) in 8-12 weeks patient will be able to throw a football with his son using his dominant arm  "(R UE)  3) In 8-12 weeks patient will report little difficulty with work related activity    Plan  Patient would benefit from: skilled physical therapy  Referral necessary: No  Planned modality interventions: TENS, cryotherapy, traction, unattended electrical stimulation and thermotherapy: hydrocollator packs    Planned therapy interventions: IASTM, joint mobilization, kinesiology taping, massage, Harris taping, manual therapy, nerve gliding, neuromuscular re-education, postural training, self care, therapeutic activities, therapeutic exercise, stretching, strengthening, flexibility, functional ROM exercises and home exercise program    Frequency: 2x week  Duration in weeks: 12  Plan of Care beginning date: 9/19/2024  Plan of Care expiration date: 12/12/2024  Treatment plan discussed with: patient        Subjective Evaluation    History of Present Illness  Mechanism of injury: -patient c/o chronic neck pain with R sided shoulder pain  -referred to PT by PCP Dr. Rutherford  -no known DEIDRA  -past work history includes heavy labor ()  -currently works as a  for the [employer] and notices pain in the elbow region when lifting 50 lb bags at work  -has noticed \"lightning\" shooting down his R arm when he turns his head in either direction while he is walking  -had MRI of cervical when he was living in [place] 4 years ago - was diagnosed with spinal stenosis and was told he was too young to have surgery  -no imaging of the shoulder to date  -has noticed weakness in the R UE (dominant arm)  -pain can radiate down his R side, down to the shoulder blade and down to elbow  -notices intermittent parasthesias into fingertips of both UEs  -was trying to weight lift at the gym this past year but felt his arm was giving out  -notices that his shoulder sometimes \"locks up\", and also has intermittent popping and clicking of the R shoulder  -notes he does get pain in the back of his head at " "times  Patient Goals  Patient goals for therapy: decreased pain and increased strength  Patient goal: to be able to throw a ball, sleep through the night without pain  Pain  Pain scale: neck = 5/10; R shoulder = 4/10.  Pain scale at lowest: neck = 5/10, R shoulder = 4/10.  Pain scale at highest: neck = 8/10, R shoulder = 8/10.    Social Support    Employment status: working  Exercise history: enjoys riding his bike, hiking, walking his dog      Diagnostic Tests  No diagnostic tests performed  Treatments  No previous or current treatments      Objective     Concurrent Complaints  Positive for disturbed sleep, headaches and visual change (noticed 1 recent episode of vertigo with double vision). Negative for night pain, dizziness, faints, nausea/motion sickness, tinnitus, trouble swallowing, difficulty breathing, shortness of breath and respiratory pain    Neurological Testing     Sensation   Cervical/Thoracic   Left   Intact: light touch    Right   Intact: light touch  Diminished: light touch    Comments   Right light touch: diminished supraclavicular area, lateral upper and lower arm, medial upper arm.     Reflexes   Left   Biceps (C5/C6): normal (2+)  Brachioradialis (C6): normal (2+)  Triceps (C7): normal (2+)    Right   Biceps (C5/C6): normal (2+)  Brachioradialis (C6): normal (2+)  Triceps (C7): normal (2+)    Active Range of Motion   Cervical/Thoracic Spine       Cervical    Flexion: 55 degrees   Extension: 50 degrees      Left lateral flexion: 35 (\"pulling\" down toward R scap) degrees     with pain  Right lateral flexion: 40 degrees      Left rotation: 60 degrees  Right rotation: 55 degrees       Right Shoulder   Flexion: 120 degrees with pain  Extension: 55 degrees   Abduction: 125 degrees with pain  External rotation BTH: T3 with pain  Internal rotation BTB: L5 with pain    Strength/Myotome Testing   Cervical Spine     Left   Interossei strength (t1): 5    Right   Interossei strength (t1): 5    Left Shoulder "     Planes of Motion   Flexion: 5   Abduction: 5   External rotation at 0°: 5   Internal rotation at 0°: 5     Right Shoulder     Planes of Motion   Flexion: 4   Abduction: 4   External rotation at 0°: 5   Internal rotation at 0°: 5     Left Elbow   Flexion: 5  Extension: 5    Right Elbow   Flexion: 4-  Extension: 5    Left Wrist/Hand   Wrist extension: 5  Wrist flexion: 5  Thumb extension: 5     (2nd hand position)     Trial 1: 140    Trial 2: 145    Trial 3: 145    Average: 143.33    Right Wrist/Hand   Wrist extension: 5  Wrist flexion: 5  Thumb extension: 5     (2nd hand position)     Trial 1: 130    Trial 2: 130    Trial 3: 130    Average: 130    Tests   Cervical   Positive neck flexor muscle endurance test.  Negative vertical compression, cervical distraction and VBI.     Left   Negative Spurling's Test A and cervical flexion-rotation test.     Right   Positive Spurling's Test A.   Negative cervical flexion-rotation test.     Lumbar   Negative vertical compression.   Neuro Exam:     Headaches   Patient reports headaches: Yes.              Precautions: h/o cervical stenosis on MRI (per patient), uncontrolled HTN  POC exp 12/12/24    Manuals 9/19            SOR KG            Cervical traction KG            Cervical upglides and downglides NV            B/l UT and levator stretching NV            PA mobs to thoracic spine             Neuro Re-Ed             UBE retro for posture             TB rows             TB pull downs             TB b/l ER             Seated chin tucks             Supine DNF endurance training                          Ther Ex             Thoracic extension over chair             Pec stretch in doorway             Thoracic extension stretch over 1/2 foam roller             Pulleys for shoulder ROM             Towel IR stretch                                                    Ther Activity                                                                              Modalities

## 2024-09-24 ENCOUNTER — OFFICE VISIT (OUTPATIENT)
Dept: PHYSICAL THERAPY | Facility: CLINIC | Age: 49
End: 2024-09-24
Payer: COMMERCIAL

## 2024-09-24 DIAGNOSIS — M25.511 CHRONIC RIGHT SHOULDER PAIN: Primary | ICD-10-CM

## 2024-09-24 DIAGNOSIS — G89.29 CHRONIC RIGHT SHOULDER PAIN: Primary | ICD-10-CM

## 2024-09-24 DIAGNOSIS — M54.2 NECK PAIN: ICD-10-CM

## 2024-09-24 DIAGNOSIS — M54.12 CERVICAL RADICULOPATHY: ICD-10-CM

## 2024-09-24 PROCEDURE — 97110 THERAPEUTIC EXERCISES: CPT | Performed by: PHYSICAL THERAPIST

## 2024-09-24 PROCEDURE — 97140 MANUAL THERAPY 1/> REGIONS: CPT | Performed by: PHYSICAL THERAPIST

## 2024-09-24 NOTE — PROGRESS NOTES
"Daily Note     Today's date: 2024  Patient name: Eloy Eden  : 1975  MRN: [id number]  Referring provider: Terrance Rutherford MD  Dx:   Encounter Diagnosis     ICD-10-CM    1. Chronic right shoulder pain  M25.511     G89.29       2. Neck pain  M54.2       3. Cervical radiculopathy  M54.12                      Subjective: Patient reports no changes since his last visit. Reported feeling \"looser\" at the end of the session.       Objective: See treatment diary below      Assessment: Tolerated treatment well. Experienced most relief from symptoms with manual traction with towel. Patient would benefit from mechanical traction in future. Patient demonstrates limited cervical mobility and significant tightness in L UT. Patient exhibited good technique with therapeutic exercises and would benefit from continued PT      Plan: Continue per plan of care.  Progress treatment as tolerated.       Precautions: h/o cervical stenosis on MRI (per patient), uncontrolled HTN  POC exp 24    Manuals            SOR KG KG           Cervical traction KG KG  W/towel           Cervical upglides and downglides NV            B/l UT and levator stretching NV KG           PA mobs to thoracic spine             Neuro Re-Ed             UBE retro for posture  120 rpm  8 mins           TB rows             TB pull downs             TB b/l ER             Seated chin tucks  Supine  Towel  Roll  5\" 2x10           Supine DNF endurance training                          Ther Ex             Thoracic extension over chair  5\" 2x10           Pec stretch in doorway  30\"x4           Thoracic extension stretch over 1/2 foam roller             Pulleys for shoulder ROM  Flexion  5\" ea  2 mins           Towel IR stretch                                                    Ther Activity                                                                              Modalities             Mechanical traction  NV  Intermittent, start at  20# max " load

## 2024-09-26 ENCOUNTER — APPOINTMENT (OUTPATIENT)
Dept: PHYSICAL THERAPY | Facility: CLINIC | Age: 49
End: 2024-09-26
Payer: COMMERCIAL

## 2024-09-28 ENCOUNTER — OFFICE VISIT (OUTPATIENT)
Dept: URGENT CARE | Facility: CLINIC | Age: 49
End: 2024-09-28
Payer: COMMERCIAL

## 2024-09-28 VITALS
WEIGHT: 269 LBS | BODY MASS INDEX: 37.66 KG/M2 | TEMPERATURE: 98.1 F | SYSTOLIC BLOOD PRESSURE: 160 MMHG | HEART RATE: 91 BPM | RESPIRATION RATE: 20 BRPM | HEIGHT: 71 IN | DIASTOLIC BLOOD PRESSURE: 87 MMHG | OXYGEN SATURATION: 97 %

## 2024-09-28 DIAGNOSIS — J06.9 ACUTE URI: Primary | ICD-10-CM

## 2024-09-28 PROCEDURE — 99213 OFFICE O/P EST LOW 20 MIN: CPT | Performed by: PHYSICIAN ASSISTANT

## 2024-09-28 RX ORDER — AZITHROMYCIN 250 MG/1
TABLET, FILM COATED ORAL
Qty: 6 TABLET | Refills: 0 | Status: SHIPPED | OUTPATIENT
Start: 2024-09-28 | End: 2024-10-02

## 2024-09-28 NOTE — PROGRESS NOTES
[facility]        NAME: Eloy Eden is a 49 y.o. male  : 1975    MRN: [id number]  DATE: 2024  TIME: 11:31 AM    Assessment and Plan   Acute URI [J06.9]  1. Acute URI  azithromycin (ZITHROMAX) 250 mg tablet            Patient Instructions     Patient Instructions   Take azithromycin as instructed.  Continue over-the-counter cough and cold medication.      Follow up with PCP in 3-5 days.  Proceed to  ER if symptoms worsen.    Chief Complaint     Chief Complaint   Patient presents with    Cold Like Symptoms     Nasal congestion progressing to chest congestion, taking multiple OTC cold meds with no improvement. Dark sputum when able to cough up.          History of Present Illness       Patient is a 49-year-old male presenting today with cold-like symptoms x 1 week.  Patient notes over the last week or so he has had persistent cold-like symptoms consisting of nasal congestion, postnasal drip and a cough, has tried different over-the-counter cough and cold medications with no relief of his symptoms.  Notes that he gets this around once or twice a year requiring antibiotics.  Reports feeling febrile a few days ago but no temperature taken.  Denies chest tightness, SOB, N/V/D, hemoptysis.        Review of Systems   Review of Systems   Constitutional:  Negative for chills and fever.   HENT:  Positive for congestion and sore throat. Negative for ear pain, sinus pressure and trouble swallowing.    Eyes:  Negative for pain.   Respiratory:  Positive for cough. Negative for chest tightness and shortness of breath.    Cardiovascular:  Negative for chest pain.   Gastrointestinal:  Negative for abdominal pain.   Musculoskeletal:  Negative for myalgias.   Skin:  Negative for rash.   Neurological:  Negative for headaches.         Current Medications       Current Outpatient Medications:     azithromycin (ZITHROMAX) 250 mg tablet, Take 2 tablets today then 1 tablet daily x 4 days, Disp: 6 tablet,  "Rfl: 0    cetirizine (ZyrTEC) 10 mg tablet, Take 10 mg by mouth daily, Disp: , Rfl:     finasteride (PROSCAR) 5 mg tablet, Take 1 tablet (5 mg total) by mouth daily, Disp: 30 tablet, Rfl: 0    lansoprazole (PREVACID) 30 mg capsule, Take 30 mg by mouth daily as needed, Disp: , Rfl:     sildenafil (VIAGRA) 50 MG tablet, Take 1 tablet (50 mg total) by mouth daily as needed for erectile dysfunction, Disp: 10 tablet, Rfl: 0    Current Allergies     Allergies as of 09/28/2024    (No Known Allergies)            The following portions of the patient's history were reviewed and updated as appropriate: allergies, current medications, past family history, past medical history, past social history, past surgical history and problem list.     History reviewed. No pertinent past medical history.    Past Surgical History:   Procedure Laterality Date    KNEE ARTHROSCOPY W/ ACL RECONSTRUCTION Bilateral     TONSILLECTOMY         Family History   Problem Relation Age of Onset    Hypertension Paternal Grandmother          Medications have been verified.        Objective   /87   Pulse 91   Temp 98.1 °F (36.7 °C)   Resp 20   Ht 5' 11\" (1.803 m)   Wt 122 kg (269 lb)   SpO2 97%   BMI 37.52 kg/m²        Physical Exam     Physical Exam  Vitals and nursing note reviewed.   Constitutional:       General: He is not in acute distress.     Appearance: Normal appearance. He is well-developed. He is not toxic-appearing.   HENT:      Head: Normocephalic and atraumatic.      Right Ear: Tympanic membrane, ear canal and external ear normal.      Left Ear: Tympanic membrane, ear canal and external ear normal.      Nose: Congestion present.      Mouth/Throat:      Mouth: Mucous membranes are moist.      Pharynx: Oropharynx is clear. No oropharyngeal exudate or posterior oropharyngeal erythema.   Eyes:      Conjunctiva/sclera: Conjunctivae normal.   Cardiovascular:      Rate and Rhythm: Normal rate and regular rhythm.      Pulses: Normal " pulses.      Heart sounds: Normal heart sounds.   Pulmonary:      Effort: Pulmonary effort is normal. No respiratory distress.      Breath sounds: Normal breath sounds. No wheezing, rhonchi or rales.   Musculoskeletal:      Cervical back: Normal range of motion. No tenderness.   Lymphadenopathy:      Cervical: No cervical adenopathy.   Skin:     General: Skin is warm.      Capillary Refill: Capillary refill takes less than 2 seconds.   Neurological:      General: No focal deficit present.      Mental Status: He is alert and oriented to person, place, and time.

## 2024-09-30 ENCOUNTER — OFFICE VISIT (OUTPATIENT)
Dept: PHYSICAL THERAPY | Facility: CLINIC | Age: 49
End: 2024-09-30
Payer: COMMERCIAL

## 2024-09-30 DIAGNOSIS — M54.2 NECK PAIN: ICD-10-CM

## 2024-09-30 DIAGNOSIS — G89.29 CHRONIC RIGHT SHOULDER PAIN: Primary | ICD-10-CM

## 2024-09-30 DIAGNOSIS — M25.511 CHRONIC RIGHT SHOULDER PAIN: Primary | ICD-10-CM

## 2024-09-30 DIAGNOSIS — M54.12 CERVICAL RADICULOPATHY: ICD-10-CM

## 2024-09-30 PROCEDURE — 97110 THERAPEUTIC EXERCISES: CPT

## 2024-09-30 PROCEDURE — 97012 MECHANICAL TRACTION THERAPY: CPT

## 2024-09-30 PROCEDURE — 97140 MANUAL THERAPY 1/> REGIONS: CPT

## 2024-09-30 NOTE — PROGRESS NOTES
"Daily Note     Today's date: 2024  Patient name: Eloy Eden  : 1975  MRN: [id number]  Referring provider: Terrance Rutherford MD  Dx:   Encounter Diagnosis     ICD-10-CM    1. Chronic right shoulder pain  M25.511     G89.29       2. Neck pain  M54.2       3. Cervical radiculopathy  M54.12                      Subjective: Pt reports he felt good for a while after last treatment.      Objective: See treatment diary below      Assessment: Tolerated treatment well. Added mechanical cervical traction today as per plan. Pt denied any contraindications. Patient demonstrated fatigue post treatment, exhibited good technique with therapeutic exercises, and would benefit from continued PT      Plan: Continue per plan of care.      Precautions: h/o cervical stenosis on MRI (per patient), uncontrolled HTN  POC exp 24    Manuals           SOR KG KG MJC          Cervical traction KG KG  W/towel D/c to mechanical   tracion          Cervical upglides and downglides NV            B/l UT and levator stretching NV KG MJC          PA mobs to thoracic spine             Neuro Re-Ed             UBE retro for posture  120 rpm  8 mins 120 rpm  8 min          TB rows             TB pull downs             TB b/l ER             Seated chin tucks  Supine  Towel  Roll  5\" 2x10 Supine towel roll  5\" x20          Supine DNF endurance training                          Ther Ex             Thoracic extension over chair  5\" 2x10 5\" 2x10          Pec stretch in doorway  30\"x4 30\" x4          Thoracic extension stretch over 1/2 foam roller             Pulleys for shoulder ROM  Flexion  5\" ea  2 mins Flexion  5\" x2 min          Towel IR stretch                                                    Ther Activity                                                                              Modalities             Mechanical traction  NV  Intermittent, start at  20# max load Intermittent,   20# max load  X10 min  No adverse effects "

## 2024-10-03 ENCOUNTER — OFFICE VISIT (OUTPATIENT)
Dept: PHYSICAL THERAPY | Facility: CLINIC | Age: 49
End: 2024-10-03
Payer: COMMERCIAL

## 2024-10-03 DIAGNOSIS — M25.511 CHRONIC RIGHT SHOULDER PAIN: Primary | ICD-10-CM

## 2024-10-03 DIAGNOSIS — M54.12 CERVICAL RADICULOPATHY: ICD-10-CM

## 2024-10-03 DIAGNOSIS — G89.29 CHRONIC RIGHT SHOULDER PAIN: Primary | ICD-10-CM

## 2024-10-03 DIAGNOSIS — M54.2 NECK PAIN: ICD-10-CM

## 2024-10-03 PROCEDURE — 97140 MANUAL THERAPY 1/> REGIONS: CPT | Performed by: PHYSICAL THERAPIST

## 2024-10-03 PROCEDURE — 97112 NEUROMUSCULAR REEDUCATION: CPT | Performed by: PHYSICAL THERAPIST

## 2024-10-03 NOTE — PROGRESS NOTES
"Daily Note     Today's date: 10/3/2024  Patient name: Eloy Eden  : 1975  MRN: [id number]  Referring provider: Terrance Rutherford MD  Dx:   Encounter Diagnosis     ICD-10-CM    1. Chronic right shoulder pain  M25.511     G89.29       2. Neck pain  M54.2       3. Cervical radiculopathy  M54.12                      Subjective: Patient reports he is ok. He felt good with traction after last visit. He feels better after being stretched in therapy, but then the tightness feeling in his neck returns. The parasthesias in his hands have been unchanged since starting PT.       Objective: See treatment diary below      Assessment: Tolerated treatment well. Progressed with addition of resistance bands with good tolerance. Patient demonstrated fatigue post treatment, exhibited good technique with therapeutic exercises, and would benefit from continued PT      Plan: Continue per plan of care.  Progress treatment as tolerated.       Precautions: h/o cervical stenosis on MRI (per patient), uncontrolled HTN  POC exp 24    Manuals 9/19 9/24 9/30 10/3         SOR KG KG MJC KG         Cervical traction KG KG  W/towel D/c to mechanical   tracion With towel (mechanical unavailable)  KG         Cervical upglides and downglides NV   KG  Gr IV  B/l         B/l UT and levator stretching NV KG MJC KG         PA mobs to thoracic spine             Neuro Re-Ed             UBE retro for posture  120 rpm  8 mins 120 rpm  8 min 100 rpm  8 mins         TB rows    Green TB  3\" x20         TB pull downs    Green TB  3\" x20         TB b/l ER    Green TB  3\" x20         Seated chin tucks  Supine  Towel  Roll  5\" 2x10 Supine towel roll  5\" x20 Supine   Towel roll  5\" x20         Supine DNF endurance training                          Ther Ex             Thoracic extension over chair  5\" 2x10 5\" 2x10 5\" x20         Pec stretch in doorway  30\"x4 30\" x4 30\"x4         Thoracic extension stretch over 1/2 foam roller             Pulleys for " "shoulder ROM  Flexion  5\" ea  2 mins Flexion  5\" x2 min Flexion  5\" x2 mins         Towel IR stretch                                                    Ther Activity                                                                              Modalities             Mechanical traction  NV  Intermittent, start at  20# max load Intermittent,   20# max load  X10 min  No adverse effects Not available                               "

## 2024-10-07 ENCOUNTER — OFFICE VISIT (OUTPATIENT)
Dept: PHYSICAL THERAPY | Facility: CLINIC | Age: 49
End: 2024-10-07
Payer: COMMERCIAL

## 2024-10-07 DIAGNOSIS — G89.29 CHRONIC RIGHT SHOULDER PAIN: Primary | ICD-10-CM

## 2024-10-07 DIAGNOSIS — M54.12 CERVICAL RADICULOPATHY: ICD-10-CM

## 2024-10-07 DIAGNOSIS — M25.511 CHRONIC RIGHT SHOULDER PAIN: Primary | ICD-10-CM

## 2024-10-07 DIAGNOSIS — M54.2 NECK PAIN: ICD-10-CM

## 2024-10-07 PROCEDURE — 97012 MECHANICAL TRACTION THERAPY: CPT

## 2024-10-07 PROCEDURE — 97140 MANUAL THERAPY 1/> REGIONS: CPT

## 2024-10-07 PROCEDURE — 97110 THERAPEUTIC EXERCISES: CPT

## 2024-10-07 PROCEDURE — 97112 NEUROMUSCULAR REEDUCATION: CPT

## 2024-10-07 NOTE — PROGRESS NOTES
"Daily Note     Today's date: 10/7/2024  Patient name: Eloy Eden  : 1975  MRN: [id number]  Referring provider: Terrance Rutherford MD  Dx:   Encounter Diagnosis     ICD-10-CM    1. Chronic right shoulder pain  M25.511     G89.29       2. Neck pain  M54.2       3. Cervical radiculopathy  M54.12                      Subjective: Pt reports he is doing a little better, feels good with neck stretching.      Objective: See treatment diary below      Assessment: Tolerated treatment well. Patient demonstrated fatigue post treatment, exhibited good technique with therapeutic exercises, and would benefit from continued PT, may consider increasing load with traction       Plan: Continue per plan of care.      Precautions: h/o cervical stenosis on MRI (per patient), uncontrolled HTN  POC exp 24    Manuals 9/19 9/24 9/30 10/3 10/7        SOR KG KG MJC KG NV        Cervical traction KG KG  W/towel D/c to mechanical   tracion With towel (mechanical unavailable)  KG         Cervical upglides and downglides NV   KG  Gr IV  B/l         B/l UT and levator stretching NV KG MJC KG MJC        PA mobs to thoracic spine             Neuro Re-Ed             UBE retro for posture  120 rpm  8 mins 120 rpm  8 min 100 rpm  8 mins 100 rpm  8 min        TB rows    Green TB  3\" x20 GTB  3\" x20        TB pull downs    Green TB  3\" x20 GTB  3\" x20        TB b/l ER    Green TB  3\" x20 GTB  3\" x20        Seated chin tucks  Supine  Towel  Roll  5\" 2x10 Supine towel roll  5\" x20 Supine   Towel roll  5\" x20 Supine towel  5\" x20        Supine DNF endurance training                          Ther Ex             Thoracic extension over chair  5\" 2x10 5\" 2x10 5\" x20 5\" x20        Pec stretch in doorway  30\"x4 30\" x4 30\"x4 30\" x4        Thoracic extension stretch over 1/2 foam roller             Pulleys for shoulder ROM  Flexion  5\" ea  2 mins Flexion  5\" x2 min Flexion  5\" x2 mins Flexion 5\" x2 min        Towel IR stretch                           "                          Ther Activity                                                                              Modalities             Mechanical traction  NV  Intermittent, start at  20# max load Intermittent,   20# max load  X10 min  No adverse effects Not available Intermittent   20# max 12 min x10 min no adverse effects  24# NV

## 2024-10-10 ENCOUNTER — OFFICE VISIT (OUTPATIENT)
Dept: PHYSICAL THERAPY | Facility: CLINIC | Age: 49
End: 2024-10-10
Payer: COMMERCIAL

## 2024-10-10 DIAGNOSIS — M54.12 CERVICAL RADICULOPATHY: ICD-10-CM

## 2024-10-10 DIAGNOSIS — M25.511 CHRONIC RIGHT SHOULDER PAIN: Primary | ICD-10-CM

## 2024-10-10 DIAGNOSIS — G89.29 CHRONIC RIGHT SHOULDER PAIN: Primary | ICD-10-CM

## 2024-10-10 DIAGNOSIS — M54.2 NECK PAIN: ICD-10-CM

## 2024-10-10 PROCEDURE — 97140 MANUAL THERAPY 1/> REGIONS: CPT | Performed by: PHYSICAL THERAPIST

## 2024-10-10 PROCEDURE — 97112 NEUROMUSCULAR REEDUCATION: CPT | Performed by: PHYSICAL THERAPIST

## 2024-10-10 PROCEDURE — 97012 MECHANICAL TRACTION THERAPY: CPT | Performed by: PHYSICAL THERAPIST

## 2024-10-10 NOTE — PROGRESS NOTES
"Daily Note     Today's date: 10/10/2024  Patient name: Eloy Eden  : 1975  MRN: [id number]  Referring provider: Terrance Rutherford MD  Dx:   Encounter Diagnosis     ICD-10-CM    1. Chronic right shoulder pain  M25.511     G89.29       2. Neck pain  M54.2       3. Cervical radiculopathy  M54.12                      Subjective:Patient reports he is noticing some improvement in his shoulder. He is able to reach behind his back easier and feels like he could throw. His neck still hurts and he still has trouble sleeping.       Objective: See treatment diary below      Assessment: Tolerated treatment well. Progressed resistance on TB this date with good tolerance. Added manual posterior capsule stretch to R shoulder with good tolerance. Increased pull on traction this date as well. Patient demonstrated fatigue post treatment, exhibited good technique with therapeutic exercises, and would benefit from continued PT      Plan: Continue per plan of care.  Progress treatment as tolerated.       Precautions: h/o cervical stenosis on MRI (per patient), uncontrolled HTN  POC exp 24    Manuals 9/19 9/24 9/30 10/3 10/7 10/10       SOR KG KG MJC KG NV KG       Cervical traction KG KG  W/towel D/c to mechanical   tracion With towel (mechanical unavailable)  KG         Cervical upglides and downglides NV   KG  Gr IV  B/l         B/l UT and levator stretching NV KG MJC KG MJC KG       PA mobs to thoracic spine      KG Gr IV       R shoulder posterior capsule stretching      KG       Neuro Re-Ed             UBE retro for posture  120 rpm  8 mins 120 rpm  8 min 100 rpm  8 mins 100 rpm  8 min 100 rpm  8 mins       TB rows    Green TB  3\" x20 GTB  3\" x20 BTB  3\" x20       TB pull downs    Green TB  3\" x20 GTB  3\" x20 BTB  3\" x20       TB b/l ER    Green TB  3\" x20 GTB  3\" x20 BTB  3\" x20       Seated chin tucks  Supine  Towel  Roll  5\" 2x10 Supine towel roll  5\" x20 Supine   Towel roll  5\" x20 Supine towel  5\" x20 Supine " "towel  5\" x20       Supine DNF endurance training                          Ther Ex             Thoracic extension over chair  5\" 2x10 5\" 2x10 5\" x20 5\" x20 5\"x20       Pec stretch in doorway  30\"x4 30\" x4 30\"x4 30\" x4 30\"x4       Thoracic extension stretch over 1/2 foam roller             Pulleys for shoulder ROM  Flexion  5\" ea  2 mins Flexion  5\" x2 min Flexion  5\" x2 mins Flexion 5\" x2 min Flexion 5\" x2 mins       Towel IR stretch                                                    Ther Activity                                                                              Modalities             Mechanical traction  NV  Intermittent, start at  20# max load Intermittent,   20# max load  X10 min  No adverse effects Not available Intermittent   20# max 12 min x10 min no adverse effects  24# NV Intermittent   24# max 14 min , 30% rope speed, 6 steps up/down, 8 min                                  "

## 2024-10-15 ENCOUNTER — OFFICE VISIT (OUTPATIENT)
Dept: PHYSICAL THERAPY | Facility: CLINIC | Age: 49
End: 2024-10-15
Payer: COMMERCIAL

## 2024-10-15 DIAGNOSIS — G89.29 CHRONIC RIGHT SHOULDER PAIN: Primary | ICD-10-CM

## 2024-10-15 DIAGNOSIS — M54.12 CERVICAL RADICULOPATHY: ICD-10-CM

## 2024-10-15 DIAGNOSIS — M54.2 NECK PAIN: ICD-10-CM

## 2024-10-15 DIAGNOSIS — M25.511 CHRONIC RIGHT SHOULDER PAIN: Primary | ICD-10-CM

## 2024-10-15 PROCEDURE — 97112 NEUROMUSCULAR REEDUCATION: CPT

## 2024-10-15 PROCEDURE — 97140 MANUAL THERAPY 1/> REGIONS: CPT

## 2024-10-15 PROCEDURE — 97012 MECHANICAL TRACTION THERAPY: CPT

## 2024-10-15 NOTE — PROGRESS NOTES
"Daily Note     Today's date: 10/15/2024  Patient name: Eloy Eden  : 1975  MRN: [id number]  Referring provider: Terrance Rutherford MD  Dx:   Encounter Diagnosis     ICD-10-CM    1. Chronic right shoulder pain  M25.511     G89.29       2. Neck pain  M54.2       3. Cervical radiculopathy  M54.12                      Subjective: Pt feel that PT has decreased his symptoms. He had relief after mechanical traction.       Objective: See treatment diary below      Assessment: Tolerated treatment well. No adverse affect to mechanical traction. Patient demonstrated fatigue post treatment, exhibited good technique with therapeutic exercises, and would benefit from continued PT      Plan: Continue per plan of care.      Precautions: h/o cervical stenosis on MRI (per patient), uncontrolled HTN  POC exp 24    Manuals 9/19 9/24 9/30 10/3 10/7 10/10 10/15      SOR KG KG MJC KG NV KG TM      Cervical traction KG KG  W/towel D/c to mechanical   tracion With towel (mechanical unavailable)  KG         Cervical upglides and downglides NV   KG  Gr IV  B/l         B/l UT and levator stretching NV KG MJC KG MJC KG TM      PA mobs to thoracic spine      KG Gr IV Resume NV      R shoulder posterior capsule stretching      KG Resume NV      Neuro Re-Ed             UBE retro for posture  120 rpm  8 mins 120 rpm  8 min 100 rpm  8 mins 100 rpm  8 min 100 rpm  8 mins 100 rpm  8 mins      TB rows    Green TB  3\" x20 GTB  3\" x20 BTB  3\" x20 BTB  3\" x20      TB pull downs    Green TB  3\" x20 GTB  3\" x20 BTB  3\" x20 BTB  3\" x20      TB b/l ER    Green TB  3\" x20 GTB  3\" x20 BTB  3\" x20 BTB  3\" x20      Seated chin tucks  Supine  Towel  Roll  5\" 2x10 Supine towel roll  5\" x20 Supine   Towel roll  5\" x20 Supine towel  5\" x20 Supine towel  5\" x20 Supine towel  5\" x20      Supine DNF endurance training                          Ther Ex             Thoracic extension over chair  5\" 2x10 5\" 2x10 5\" x20 5\" x20 5\"x20 5\" x20      Pec stretch in " "doorway  30\"x4 30\" x4 30\"x4 30\" x4 30\"x4 30\" x4      Thoracic extension stretch over 1/2 foam roller             Pulleys for shoulder ROM  Flexion  5\" ea  2 mins Flexion  5\" x2 min Flexion  5\" x2 mins Flexion 5\" x2 min Flexion 5\" x2 mins Flex 5\" x2 mins      Towel IR stretch                                                    Ther Activity                                                                              Modalities             Mechanical traction  NV  Intermittent, start at  20# max load Intermittent,   20# max load  X10 min  No adverse effects Not available Intermittent   20# max 12 min x10 min no adverse effects  24# NV Intermittent   24# max 14 min , 30% rope speed, 6 steps up/down, 8 min  Intermittent   26# max 18 min , 30% rope speed, 6 steps up/down, 8 min                                   "

## 2024-10-17 ENCOUNTER — APPOINTMENT (OUTPATIENT)
Dept: PHYSICAL THERAPY | Facility: CLINIC | Age: 49
End: 2024-10-17
Payer: COMMERCIAL

## 2024-10-22 ENCOUNTER — APPOINTMENT (OUTPATIENT)
Dept: PHYSICAL THERAPY | Facility: CLINIC | Age: 49
End: 2024-10-22
Payer: COMMERCIAL

## 2024-10-23 ENCOUNTER — OFFICE VISIT (OUTPATIENT)
Dept: PHYSICAL THERAPY | Facility: CLINIC | Age: 49
End: 2024-10-23
Payer: COMMERCIAL

## 2024-10-23 DIAGNOSIS — G89.29 CHRONIC RIGHT SHOULDER PAIN: Primary | ICD-10-CM

## 2024-10-23 DIAGNOSIS — M54.2 NECK PAIN: ICD-10-CM

## 2024-10-23 DIAGNOSIS — M25.511 CHRONIC RIGHT SHOULDER PAIN: Primary | ICD-10-CM

## 2024-10-23 DIAGNOSIS — M54.12 CERVICAL RADICULOPATHY: ICD-10-CM

## 2024-10-23 PROCEDURE — 97012 MECHANICAL TRACTION THERAPY: CPT

## 2024-10-23 PROCEDURE — 97140 MANUAL THERAPY 1/> REGIONS: CPT

## 2024-10-23 PROCEDURE — 97110 THERAPEUTIC EXERCISES: CPT

## 2024-10-23 PROCEDURE — 97112 NEUROMUSCULAR REEDUCATION: CPT

## 2024-10-23 NOTE — PROGRESS NOTES
"Daily Note     Today's date: 10/23/2024  Patient name: Eloy Eden  : 1975  MRN: [id number]  Referring provider: Terrance Rutherford MD  Dx:   Encounter Diagnosis     ICD-10-CM    1. Chronic right shoulder pain  M25.511     G89.29       2. Neck pain  M54.2       3. Cervical radiculopathy  M54.12           Start Time: 1600  Stop Time: 1710  Total time in clinic (min): 70 minutes    Subjective: Pt reports he continues to get symptoms into hands especially with turning head to opposite side. Notices improved ROM R shoulder. Feels traction is helpful.      Objective: See treatment diary below      Assessment: Tolerated treatment well. Patient demonstrated fatigue post treatment, exhibited good technique with therapeutic exercises, and would benefit from continued PT      Plan: Continue per plan of care.      Precautions: h/o cervical stenosis on MRI (per patient), uncontrolled HTN  POC exp 24    Manuals 9/19 9/24 9/30 10/3 10/7 10/10 10/15 10/23     SOR KG KG MJC KG NV KG TM MJC     Cervical traction KG KG  W/towel D/c to mechanical   tracion With towel (mechanical unavailable)  KG         Cervical upglides and downglides NV   KG  Gr IV  B/l         B/l UT and levator stretching NV KG MJC KG MJC KG TM MJC     PA mobs to thoracic spine      KG Gr IV Resume NV NV     R shoulder posterior capsule stretching      KG Resume NV MJC     Neuro Re-Ed             UBE retro for posture  120 rpm  8 mins 120 rpm  8 min 100 rpm  8 mins 100 rpm  8 min 100 rpm  8 mins 100 rpm  8 mins 100 rpm  X8 min     TB rows    Green TB  3\" x20 GTB  3\" x20 BTB  3\" x20 BTB  3\" x20 Blue   3\" x20      TB pull downs    Green TB  3\" x20 GTB  3\" x20 BTB  3\" x20 BTB  3\" x20 Blue   3\" x20     TB b/l ER    Green TB  3\" x20 GTB  3\" x20 BTB  3\" x20 BTB  3\" x20 Blue 3\" x20     Seated chin tucks  Supine  Towel  Roll  5\" 2x10 Supine towel roll  5\" x20 Supine   Towel roll  5\" x20 Supine towel  5\" x20 Supine towel  5\" x20 Supine towel  5\" x20 Supine " "towel  5\" x20     Supine DNF endurance training                          Ther Ex             Thoracic extension over chair  5\" 2x10 5\" 2x10 5\" x20 5\" x20 5\"x20 5\" x20 5\" x20     Pec stretch in doorway  30\"x4 30\" x4 30\"x4 30\" x4 30\"x4 30\" x4 30\" x4     Thoracic extension stretch over 1/2 foam roller             Pulleys for shoulder ROM  Flexion  5\" ea  2 mins Flexion  5\" x2 min Flexion  5\" x2 mins Flexion 5\" x2 min Flexion 5\" x2 mins Flex 5\" x2 mins Flex 5\" x2 min     Towel IR stretch                                                    Ther Activity                                                                              Modalities             Mechanical traction  NV  Intermittent, start at  20# max load Intermittent,   20# max load  X10 min  No adverse effects Not available Intermittent   20# max 12 min x10 min no adverse effects  24# NV Intermittent   24# max 14 min , 30% rope speed, 6 steps up/down, 8 min  Intermittent   26# max 18 min , 30% rope speed, 6 steps up/down, 8 min  Intermittent   26# max 18 min , 30% rope speed, 6 steps up/down, 8 min                                    "

## 2024-10-24 ENCOUNTER — EVALUATION (OUTPATIENT)
Dept: PHYSICAL THERAPY | Facility: CLINIC | Age: 49
End: 2024-10-24
Payer: COMMERCIAL

## 2024-10-24 DIAGNOSIS — M54.2 NECK PAIN: ICD-10-CM

## 2024-10-24 DIAGNOSIS — M54.2 CHRONIC NECK PAIN: Primary | ICD-10-CM

## 2024-10-24 DIAGNOSIS — M25.511 CHRONIC RIGHT SHOULDER PAIN: Primary | ICD-10-CM

## 2024-10-24 DIAGNOSIS — G89.29 CHRONIC NECK PAIN: Primary | ICD-10-CM

## 2024-10-24 DIAGNOSIS — G89.29 CHRONIC RIGHT SHOULDER PAIN: Primary | ICD-10-CM

## 2024-10-24 DIAGNOSIS — M54.12 CERVICAL RADICULOPATHY: ICD-10-CM

## 2024-10-24 PROCEDURE — 97140 MANUAL THERAPY 1/> REGIONS: CPT | Performed by: PHYSICAL THERAPIST

## 2024-10-24 PROCEDURE — 97012 MECHANICAL TRACTION THERAPY: CPT | Performed by: PHYSICAL THERAPIST

## 2024-10-24 PROCEDURE — 97112 NEUROMUSCULAR REEDUCATION: CPT | Performed by: PHYSICAL THERAPIST

## 2024-10-24 NOTE — PROGRESS NOTES
PT Re-Evaluation  and PT Discharge    Today's date: 10/24/2024  Patient name: Eloy Eden  : 1975  MRN: [id number]  Referring provider: Terrance Rutherford MD  Dx:   Encounter Diagnosis     ICD-10-CM    1. Chronic right shoulder pain  M25.511     G89.29       2. Neck pain  M54.2       3. Cervical radiculopathy  M54.12                        Assessment  Impairments: abnormal or restricted ROM, abnormal movement, activity intolerance, impaired physical strength, lacks appropriate home exercise program, pain with function, poor posture , participation limitations and activity limitations  Functional limitations: interrupted sleep, weakness in R UE with lifting activity, unable to throw with R UE, limited functional reaching behind the back and behind the head    Assessment details: Eloy Eden has been compliant with PT services. He has attended a total of 9 visits of OPPT. He demonstrated improved ROM of cervical spine and R shoulder. Despite improvement in ROM, patient reports no improvement in his symptoms. At this time, patient should have MRI of cervical spine to determine extent of degenerative changes and to determine next step of treatment. Patient may require pain management, though he is also considering surgical intervention if candidate in near future. PCP was contacted and order for cervical MRI will be placed. Patient Dc'd from PT today. Patient in agreement with POC.   Understanding of Dx/Px/POC: good     Prognosis: good    Goals  STGs  1) In 4 weeks patient will report 3 points reduced pain - not met  2) In 4 weeks patient will demonstrate pain free cervical ROM in all directions without ROM limitations - not met  3) In 4-6 weeks patient will demonstrate 1/2 grade improvement in R UE strength grossly - MET    LTGs  1) In 8-12 weeks patient will report no interruption of sleep from pain- not met  2) in 8-12 weeks patient will be able to throw a football with his son using his dominant arm (R UE)  "- not met  3) In 8-12 weeks patient will report little difficulty with work related activity - not met    Plan  Referral necessary: Yes    Treatment plan discussed with: patient        Subjective Evaluation    History of Present Illness  Mechanism of injury: -patient c/o chronic neck pain with R sided shoulder pain  -referred to PT by PCP Dr. Rutherford  -no known DEIDRA  -past work history includes heavy labor ()  -currently works as a  for the [employer] and notices pain in the elbow region when lifting 50 lb bags at work  -has noticed \"lightning\" shooting down his R arm when he turns his head in either direction while he is walking  -had MRI of cervical when he was living in [place] 4 years ago - was diagnosed with spinal stenosis and was told he was too young to have surgery  -no imaging of the shoulder to date  -has noticed weakness in the R UE (dominant arm)  -pain can radiate down his R side, down to the shoulder blade and down to elbow  -notices intermittent parasthesias into fingertips of both UEs  -was trying to weight lift at the gym this past year but felt his arm was giving out  -notices that his shoulder sometimes \"locks up\", and also has intermittent popping and clicking of the R shoulder  -notes he does get pain in the back of his head at times    Re-evaluation 10/24/24:  -patient did some bike riding this weekend, did 25 miles one day then 12 the next day, about 8 miles the 3rd day in addition to hiking after that  -patient reports having a significant headache after that  -he reports his neck only feels better when he gets stretched, but no better overall  -traction feels good but does not last more than 10 mins  -his shoulder feels like he gained some range of motion, he recently through an [employer] core and was able to throw  -the shoulder still wakes him up in the middle of the night  -has not noticed changes in other symptoms such as tingling in the " "fingertips  Patient Goals  Patient goals for therapy: decreased pain and increased strength  Patient goal: to be able to throw a ball, sleep through the night without pain  Pain  Pain scale: neck = 5/10; R shoulder = 5/10.  Pain scale at lowest: neck = 5/10, R shoulder = 0/10.  Pain scale at highest: neck = 8/10, R shoulder = 8/10.    Social Support    Employment status: working  Exercise history: enjoys riding his bike, hiking, walking his dog      Diagnostic Tests  No diagnostic tests performed  Treatments  No previous or current treatments      Objective     Concurrent Complaints  Positive for disturbed sleep, headaches and visual change (noticed 1 recent episode of vertigo with double vision). Negative for night pain, dizziness, faints, nausea/motion sickness, tinnitus, trouble swallowing, difficulty breathing, shortness of breath and respiratory pain    Neurological Testing     Sensation   Cervical/Thoracic   Left   Intact: light touch    Right   Intact: light touch  Diminished: light touch    Comments   Right light touch: diminished supraclavicular area, lateral upper and lower arm, medial upper arm.     Reflexes   Left   Biceps (C5/C6): normal (2+)  Brachioradialis (C6): normal (2+)  Triceps (C7): normal (2+)    Right   Biceps (C5/C6): normal (2+)  Brachioradialis (C6): normal (2+)  Triceps (C7): normal (2+)    Active Range of Motion   Cervical/Thoracic Spine       Cervical    Flexion: 50 degrees   Extension: 50 degrees      Left lateral flexion: 35 (\"pulling\" down toward R scap) degrees     with pain  Right lateral flexion: 35 (\"sharp shooting pain up toward L ear\") degrees      Left rotation: 65 degrees  Right rotation: 70 degrees       Right Shoulder   Flexion: 160 degrees with pain  Extension: 55 degrees   Abduction: 140 degrees with pain  External rotation BTH: T4   Internal rotation BTB: T12 with pain    Strength/Myotome Testing   Cervical Spine     Left   Interossei strength (t1): 5    Right "   Interossei strength (t1): 5    Left Shoulder     Planes of Motion   Flexion: 5   Abduction: 5   External rotation at 0°: 5   Internal rotation at 0°: 5     Right Shoulder     Planes of Motion   Flexion: 4+   Abduction: 4+   External rotation at 0°: 5   External rotation at 90°: 5   Internal rotation at 0°: 5     Left Elbow   Flexion: 5  Extension: 5    Right Elbow   Flexion: 4-  Extension: 5    Left Wrist/Hand   Wrist extension: 5  Wrist flexion: 5  Thumb extension: 5     (2nd hand position)     Trial 1: 140    Trial 2: 145    Trial 3: 145    Average: 143.33    Right Wrist/Hand   Wrist extension: 5  Wrist flexion: 5  Thumb extension: 5     (2nd hand position)     Trial 1: 130    Trial 2: 130    Trial 3: 130    Average: 130    Tests   Cervical   Positive neck flexor muscle endurance test.  Negative vertical compression, cervical distraction and VBI.     Left   Negative Spurling's Test A and cervical flexion-rotation test.     Right   Positive Spurling's Test A.   Negative cervical flexion-rotation test.     Right Shoulder   Positive empty can, Hawkin's, Neer's and passive horizontal adduction.   Negative belly press, drop arm and external rotation lag sign.     Lumbar   Negative vertical compression.   Neuro Exam:     Headaches   Patient reports headaches: Yes.              Precautions: h/o cervical stenosis on MRI (per patient), uncontrolled HTN  POC exp 12/12/24    Manuals 9/19 9/24 9/30 10/3 10/7 10/10 10/15 10/23 10/24    SOR KG KG MJC KG NV KG TM MJC KG    Cervical traction KG KG  W/towel D/c to mechanical   tracion With towel (mechanical unavailable)  KG         Cervical upglides and downglides NV   KG  Gr IV  B/l         B/l UT and levator stretching NV KG MJC KG MJC KG TM MJC KG    PA mobs to thoracic spine      KG Gr IV Resume NV NV     R shoulder posterior capsule stretching      KG Resume NV MJC KG    Neuro Re-Ed             UBE retro for posture  120 rpm  8 mins 120 rpm  8 min 100 rpm  8 mins  "100 rpm  8 min 100 rpm  8 mins 100 rpm  8 mins 100 rpm  X8 min 100 rpm  X10 mins    TB rows    Green TB  3\" x20 GTB  3\" x20 BTB  3\" x20 BTB  3\" x20 Blue   3\" x20      TB pull downs    Green TB  3\" x20 GTB  3\" x20 BTB  3\" x20 BTB  3\" x20 Blue   3\" x20     TB b/l ER    Green TB  3\" x20 GTB  3\" x20 BTB  3\" x20 BTB  3\" x20 Blue 3\" x20     Seated chin tucks  Supine  Towel  Roll  5\" 2x10 Supine towel roll  5\" x20 Supine   Towel roll  5\" x20 Supine towel  5\" x20 Supine towel  5\" x20 Supine towel  5\" x20 Supine towel  5\" x20     Supine DNF endurance training                          Ther Ex             Thoracic extension over chair  5\" 2x10 5\" 2x10 5\" x20 5\" x20 5\"x20 5\" x20 5\" x20     Pec stretch in doorway  30\"x4 30\" x4 30\"x4 30\" x4 30\"x4 30\" x4 30\" x4     Thoracic extension stretch over 1/2 foam roller             Pulleys for shoulder ROM  Flexion  5\" ea  2 mins Flexion  5\" x2 min Flexion  5\" x2 mins Flexion 5\" x2 min Flexion 5\" x2 mins Flex 5\" x2 mins Flex 5\" x2 min     Towel IR stretch                                                    Ther Activity                                                                              Modalities             Mechanical traction  NV  Intermittent, start at  20# max load Intermittent,   20# max load  X10 min  No adverse effects Not available Intermittent   20# max 12 min x10 min no adverse effects  24# NV Intermittent   24# max 14 min , 30% rope speed, 6 steps up/down, 8 min  Intermittent   26# max 18 min , 30% rope speed, 6 steps up/down, 8 min  Intermittent   26# max 18 min , 30% rope speed, 6 steps up/down, 8 min  Intermittent   26# max 20# min , 30% rope speed, 6 steps up/down, 8 min                       "

## 2024-10-29 ENCOUNTER — APPOINTMENT (OUTPATIENT)
Dept: PHYSICAL THERAPY | Facility: CLINIC | Age: 49
End: 2024-10-29
Payer: COMMERCIAL

## 2024-10-31 ENCOUNTER — APPOINTMENT (OUTPATIENT)
Dept: PHYSICAL THERAPY | Facility: CLINIC | Age: 49
End: 2024-10-31
Payer: COMMERCIAL

## 2024-11-26 ENCOUNTER — HOSPITAL ENCOUNTER (OUTPATIENT)
Dept: MRI IMAGING | Facility: HOSPITAL | Age: 49
Discharge: HOME | End: 2024-11-26
Attending: STUDENT IN AN ORGANIZED HEALTH CARE EDUCATION/TRAINING PROGRAM
Payer: COMMERCIAL

## 2024-11-26 DIAGNOSIS — G89.29 CHRONIC NECK PAIN: ICD-10-CM

## 2024-11-26 DIAGNOSIS — M54.2 CHRONIC NECK PAIN: ICD-10-CM

## 2024-11-26 DIAGNOSIS — M25.511 ACUTE PAIN OF RIGHT SHOULDER: ICD-10-CM

## 2024-11-26 PROCEDURE — 73221 MRI JOINT UPR EXTREM W/O DYE: CPT

## 2024-11-26 PROCEDURE — 72141 MRI NECK SPINE W/O DYE: CPT

## 2024-12-02 ENCOUNTER — RESULTS FOLLOW-UP (OUTPATIENT)
Dept: FAMILY MEDICINE CLINIC | Facility: CLINIC | Age: 49
End: 2024-12-02

## 2024-12-16 DIAGNOSIS — N52.9 ERECTILE DYSFUNCTION, UNSPECIFIED ERECTILE DYSFUNCTION TYPE: ICD-10-CM

## 2024-12-17 RX ORDER — SILDENAFIL 50 MG/1
50 TABLET, FILM COATED ORAL DAILY PRN
Qty: 10 TABLET | Refills: 0 | Status: SHIPPED | OUTPATIENT
Start: 2024-12-17

## 2025-02-19 ENCOUNTER — OFFICE VISIT (OUTPATIENT)
Dept: FAMILY MEDICINE CLINIC | Facility: CLINIC | Age: 50
End: 2025-02-19
Payer: COMMERCIAL

## 2025-02-19 VITALS
DIASTOLIC BLOOD PRESSURE: 92 MMHG | HEART RATE: 68 BPM | OXYGEN SATURATION: 99 % | BODY MASS INDEX: 38.81 KG/M2 | HEIGHT: 71 IN | TEMPERATURE: 97.2 F | WEIGHT: 277.2 LBS | SYSTOLIC BLOOD PRESSURE: 156 MMHG

## 2025-02-19 DIAGNOSIS — N52.9 ERECTILE DYSFUNCTION, UNSPECIFIED ERECTILE DYSFUNCTION TYPE: ICD-10-CM

## 2025-02-19 DIAGNOSIS — G89.29 CHRONIC NECK PAIN: ICD-10-CM

## 2025-02-19 DIAGNOSIS — Z00.00 ANNUAL PHYSICAL EXAM: Primary | ICD-10-CM

## 2025-02-19 DIAGNOSIS — M54.2 CHRONIC NECK PAIN: ICD-10-CM

## 2025-02-19 DIAGNOSIS — R06.83 SNORING: ICD-10-CM

## 2025-02-19 DIAGNOSIS — S43.431D GLENOID LABRUM TEAR, RIGHT, SUBSEQUENT ENCOUNTER: ICD-10-CM

## 2025-02-19 PROCEDURE — 99396 PREV VISIT EST AGE 40-64: CPT | Performed by: STUDENT IN AN ORGANIZED HEALTH CARE EDUCATION/TRAINING PROGRAM

## 2025-02-19 RX ORDER — VARDENAFIL HYDROCHLORIDE 20 MG/1
20 TABLET ORAL DAILY PRN
Qty: 10 TABLET | Refills: 0 | Status: SHIPPED | OUTPATIENT
Start: 2025-02-19

## 2025-02-19 NOTE — ASSESSMENT & PLAN NOTE
Orders:    vardenafil (LEVITRA) 20 MG tablet; Take 1 tablet (20 mg total) by mouth daily as needed for erectile dysfunction

## 2025-02-19 NOTE — PROGRESS NOTES
Adult Annual Physical  Name: Eloy Eden      : 1975      MRN: [id number]  Encounter Provider: Terrance Rutherford MD  Encounter Date: 2025   Encounter department: [facility]    Assessment & Plan  Annual physical exam    Orders:    Comprehensive metabolic panel; Future    Lipid Panel with Direct LDL reflex; Future    Hemoglobin A1C; Future    TSH, 3rd generation with Free T4 reflex; Future    CBC and differential; Future    Testosterone, free, total; Future    Chronic neck pain    Orders:    Ambulatory Referral to Neurosurgery; Future    Glenoid labrum tear, right, subsequent encounter         Erectile dysfunction, unspecified erectile dysfunction type    Orders:    vardenafil (LEVITRA) 20 MG tablet; Take 1 tablet (20 mg total) by mouth daily as needed for erectile dysfunction    Snoring    Orders:    Ambulatory Referral to Sleep Medicine; Future    Immunizations and preventive care screenings were discussed with patient today. Appropriate education was printed on patient's after visit summary.        Counseling:  Alcohol/drug use: discussed moderation in alcohol intake, the recommendations for healthy alcohol use, and avoidance of illicit drug use.  Dental Health: discussed importance of regular tooth brushing, flossing, and dental visits.  Injury prevention: discussed safety/seat belts, safety helmets, smoke detectors, carbon monoxide detectors, and smoking near bedding or upholstery.  Sexual health: discussed sexually transmitted diseases, partner selection, use of condoms, avoidance of unintended pregnancy, and contraceptive alternatives.  Exercise: the importance of regular exercise/physical activity was discussed. Recommend exercise 3-5 times per week for at least 30 minutes.          History of Present Illness     Adult Annual Physical:  Patient presents for annual physical.     Diet and Physical Activity:    - Exercise: walking.    Depression Screening:  - PHQ-2 Score:  "0    General Health:  - Sleep: sleeps well.  - Hearing: normal hearing right ear and normal hearing left ear.  - Vision: no vision problems.  - Dental: regular dental visits.     Health:  - History of STDs: no.   - Urinary symptoms: none.     Review of Systems   Constitutional:  Negative for activity change, appetite change, chills, fatigue and fever.   HENT:  Negative for congestion, dental problem, drooling, ear discharge, ear pain, facial swelling, postnasal drip, rhinorrhea and sinus pain.    Eyes:  Negative for photophobia, pain, discharge and itching.   Respiratory:  Negative for apnea, cough, chest tightness and shortness of breath.    Cardiovascular:  Negative for chest pain and leg swelling.   Gastrointestinal:  Negative for abdominal distention, abdominal pain, anal bleeding, constipation, diarrhea and nausea.   Endocrine: Negative for cold intolerance, heat intolerance and polydipsia.   Genitourinary:  Negative for difficulty urinating.   Musculoskeletal:  Positive for neck pain and neck stiffness. Negative for arthralgias, gait problem, joint swelling and myalgias.   Skin:  Negative for color change and pallor.   Allergic/Immunologic: Negative for immunocompromised state.   Neurological:  Negative for dizziness, seizures, facial asymmetry, weakness, light-headedness, numbness and headaches.   Psychiatric/Behavioral:  Negative for agitation, behavioral problems, confusion, decreased concentration and dysphoric mood.    All other systems reviewed and are negative.    Pertinent Medical History             Objective   /92 (BP Location: Left arm, Patient Position: Sitting, Cuff Size: Adult)   Pulse 68   Temp (!) 97.2 °F (36.2 °C) (Tympanic)   Ht 5' 11\" (1.803 m)   Wt 126 kg (277 lb 3.2 oz)   SpO2 99%   BMI 38.66 kg/m²     Physical Exam  Constitutional:       Appearance: He is well-developed. He is obese.   HENT:      Head: Normocephalic.   Eyes:      Pupils: Pupils are equal, round, and reactive " to light.   Cardiovascular:      Rate and Rhythm: Normal rate and regular rhythm.   Pulmonary:      Effort: Pulmonary effort is normal.      Breath sounds: Normal breath sounds.   Abdominal:      General: Bowel sounds are normal.      Palpations: Abdomen is soft.   Musculoskeletal:         General: Normal range of motion.      Cervical back: Normal range of motion and neck supple.   Skin:     General: Skin is warm.   Neurological:      Mental Status: He is alert.

## 2025-02-24 ENCOUNTER — TELEPHONE (OUTPATIENT)
Dept: SLEEP CENTER | Facility: CLINIC | Age: 50
End: 2025-02-24

## 2025-02-24 NOTE — TELEPHONE ENCOUNTER
Referral placed for a home sleep study. Note does not reflect discussion of symptoms listed on order.    Please addend note with specific discussion of symptoms.    Following a review of the Sleep Study/Sleep Consult ordering and insurance approval process, going forward, the Sleep Medicine Department is asking that the specific symptoms selected within the order for a sleep study and/or Sleep Medicine Consult (i.e. witnessed apneas, snoring, daytime sleepiness, etc.) be directly discussed (if even briefly) within the note from the face-to-face encounter. This is a slight change from what was asked in the past, but will help with proper compliance from a coding and insurance approval standpoint, ensuring that sleep studies are able to obtain insurance approval and that patients are then able to obtain the studies without difficulty. Thank you!     Ordering and co-signing providers notified.

## 2025-03-03 ENCOUNTER — TELEPHONE (OUTPATIENT)
Dept: FAMILY MEDICINE CLINIC | Facility: CLINIC | Age: 50
End: 2025-03-03

## 2025-03-03 ENCOUNTER — TELEPHONE (OUTPATIENT)
Age: 50
End: 2025-03-03

## 2025-03-03 DIAGNOSIS — N52.9 ERECTILE DYSFUNCTION, UNSPECIFIED ERECTILE DYSFUNCTION TYPE: Primary | ICD-10-CM

## 2025-03-03 RX ORDER — TADALAFIL 10 MG/1
10 TABLET ORAL DAILY PRN
Qty: 30 TABLET | Refills: 0 | Status: SHIPPED | OUTPATIENT
Start: 2025-03-03

## 2025-03-03 NOTE — TELEPHONE ENCOUNTER
Patient stopped in the office Friday at 3:50 pm after being at his pharmacy.  He went to  the Levitra but pharmacy can't getit.  They suggested trying Cialis, patient agreeable to that and asked if you could place that  order for him to see if his insurance will cover that

## 2025-03-07 ENCOUNTER — CONSULT (OUTPATIENT)
Dept: NEUROSURGERY | Facility: CLINIC | Age: 50
End: 2025-03-07
Payer: COMMERCIAL

## 2025-03-07 VITALS
HEART RATE: 73 BPM | HEIGHT: 71 IN | DIASTOLIC BLOOD PRESSURE: 78 MMHG | WEIGHT: 277 LBS | OXYGEN SATURATION: 94 % | SYSTOLIC BLOOD PRESSURE: 132 MMHG | BODY MASS INDEX: 38.78 KG/M2 | TEMPERATURE: 98.6 F

## 2025-03-07 DIAGNOSIS — M50.30 OTHER CERVICAL DISC DEGENERATION, UNSPECIFIED CERVICAL REGION: Primary | ICD-10-CM

## 2025-03-07 DIAGNOSIS — M40.202 CERVICAL KYPHOSIS: ICD-10-CM

## 2025-03-07 DIAGNOSIS — M79.602 BILATERAL ARM PAIN: ICD-10-CM

## 2025-03-07 DIAGNOSIS — G89.29 CHRONIC NECK PAIN: ICD-10-CM

## 2025-03-07 DIAGNOSIS — M54.2 CHRONIC NECK PAIN: ICD-10-CM

## 2025-03-07 DIAGNOSIS — M79.601 BILATERAL ARM PAIN: ICD-10-CM

## 2025-03-07 PROCEDURE — 99204 OFFICE O/P NEW MOD 45 MIN: CPT | Performed by: NEUROLOGICAL SURGERY

## 2025-03-07 NOTE — PROGRESS NOTES
Name: Eloy Eden      : 1975      MRN: [id number]  Encounter Provider: Demarcus Li MD  Encounter Date: 3/7/2025   Encounter department: [facility] [place]  :  Assessment & Plan  Other cervical disc degeneration, unspecified cervical region    Orders:  •  XR spine cervical complete 6+ vw flex/ext/obl; Future    Cervical kyphosis    Orders:  •  XR spine cervical complete 6+ vw flex/ext/obl; Future    Chronic neck pain    Orders:  •  XR spine cervical complete 6+ vw flex/ext/obl; Future  •  Ambulatory Referral to Neurosurgery    Bilateral arm pain    Orders:  •  EMG; Future    49-year-old male who reports multiple years of neck and arm symptoms.  He describes today his neck is bilateral posterior aspect, into his trapezius bilaterally to the shoulders and sometimes interscapular.  Severity 5/10.  He also has concerns that when he has a gag episode, which he states can be quite violent, he will have shooting pins needles and numbness into his bilateral arms involving entire arm and hands and fingers and his arms can feel dead for a minute or 2.  He describes numbness and tingling in his 3rd through 5th fingers bilaterally.  Some issues with his bladder, urgency and some incontinence but no obvious retention or hesitancy.    On exam full strength IO, , wrist extensor, biceps and deltoids bilaterally.  No obvious Cynthia's or clonus.  Positive Tinel's at the elbow bilaterally.    MRI scan cervical spine shows some straightening if not mild reversal of his cervical lordosis.  There is some mild stenosis at C4-5 with perhaps some foraminal stenosis but no ananth cord compression, myelomalacia etc.    I reviewed the patient is very symptoms.  The numbness in his fingers 3 through 5 bilaterally is cubital tunnel syndrome.  I counseled him on conservative measures such as behavioral modifications, perhaps bracing, and ultimately the option of surgical decompression if  necessary.  I counseled him on worsening symptoms such as hand weakness.    It is possible he may have some C5 radicular symptoms, and that is his worst cervical level but it is not profound.  We talked about various options for management.  He did get good neck pain relief from physical therapy and traction and I have encouraged him to continue that either formally or through home exercise program.  I suggested pain management but we deferred at present.  I counseled him that surgery for neck pain does not tend to be particularly rewarding.    To ensure there is no occult instability, I will send him for flexion-extension x-rays.  Perhaps it is possible when he does have his violent gag episodes he does generate some stenosis or foraminal compromise.  This likely should be detectable on flexion-extension x-rays.  Further, I recommend an EMG to confirm cubital tunnel and assess for the presence of any C5 radiculopathy.    I will plan to see the patient back after these studies are completed.  The patient also notes he has follow-up with urology in the near future to assess the potential for prostate issues, which I think is most likely the etiology for his urinary issues.    03/07/25 Metrics: [id number]; VAS 65; MJOA 13/17    History of Present Illness     Eloy Eden is a 49 y.o. male who presents for evaluation of neck pain and arm pain    HPI     Review of Systems   Genitourinary:  Positive for urgency.   Musculoskeletal:  Positive for neck pain and neck stiffness.        Rm1    Neck pain into BUE w/ NT in last 3 digits of both hands, weakened , vertigo, decreased ROM, neck grinds, electricity down opposite arm when looking in opposite direction, with neck projection arms go numb and pain increases lasting minutes at a time, UA urgency/dribbling    PT - helpful with traction  No PM/MARCELLE/Spine Surgeries   Neurological:  Positive for dizziness, weakness and numbness.   All other systems reviewed and  "are negative.    I have personally reviewed the MA's review of systems and made changes as necessary.        Past Medical History   History reviewed. No pertinent past medical history.  Past Surgical History:   Procedure Laterality Date   • KNEE ARTHROSCOPY W/ ACL RECONSTRUCTION Bilateral    • TONSILLECTOMY       Family History   Problem Relation Age of Onset   • Hypertension Paternal Grandmother      he reports that he has never smoked. He has never been exposed to tobacco smoke. He has never used smokeless tobacco. He reports current alcohol use of about 2.0 standard drinks of alcohol per week. He reports that he does not use drugs.  Current Outpatient Medications   Medication Instructions   • cetirizine (ZYRTEC) 10 mg, Daily   • lansoprazole (PREVACID) 30 mg, Daily   • Naproxen Sodium (ALEVE PO) As needed   • tadalafil (CIALIS) 10 mg, Oral, Daily PRN   • vardenafil (LEVITRA) 20 mg, Oral, Daily PRN   No Known Allergies   Objective   /78 (BP Location: Left arm, Patient Position: Sitting, Cuff Size: Adult)   Pulse 73   Temp 98.6 °F (37 °C) (Temporal)   Ht 5' 11\" (1.803 m)   Wt 126 kg (277 lb)   SpO2 94%   BMI 38.63 kg/m²     Physical Exam  Neurological Exam  See discussion    MDM:     See discussion.               "

## 2025-03-13 ENCOUNTER — APPOINTMENT (OUTPATIENT)
Dept: RADIOLOGY | Facility: CLINIC | Age: 50
End: 2025-03-13
Payer: COMMERCIAL

## 2025-03-13 ENCOUNTER — APPOINTMENT (OUTPATIENT)
Age: 50
End: 2025-03-13
Payer: COMMERCIAL

## 2025-03-13 ENCOUNTER — APPOINTMENT (OUTPATIENT)
Dept: URGENT CARE | Facility: CLINIC | Age: 50
End: 2025-03-13
Payer: COMMERCIAL

## 2025-03-13 DIAGNOSIS — M25.511 ACUTE PAIN OF RIGHT SHOULDER: ICD-10-CM

## 2025-03-13 DIAGNOSIS — Z00.00 ANNUAL PHYSICAL EXAM: ICD-10-CM

## 2025-03-13 DIAGNOSIS — M54.2 CHRONIC NECK PAIN: ICD-10-CM

## 2025-03-13 DIAGNOSIS — M50.30 OTHER CERVICAL DISC DEGENERATION, UNSPECIFIED CERVICAL REGION: ICD-10-CM

## 2025-03-13 DIAGNOSIS — G89.29 CHRONIC NECK PAIN: ICD-10-CM

## 2025-03-13 DIAGNOSIS — M40.202 CERVICAL KYPHOSIS: ICD-10-CM

## 2025-03-13 LAB
ALBUMIN SERPL BCG-MCNC: 4.2 G/DL (ref 3.5–5)
ALP SERPL-CCNC: 44 U/L (ref 34–104)
ALT SERPL W P-5'-P-CCNC: 15 U/L (ref 7–52)
ANION GAP SERPL CALCULATED.3IONS-SCNC: 6 MMOL/L (ref 4–13)
AST SERPL W P-5'-P-CCNC: 15 U/L (ref 13–39)
BASOPHILS # BLD AUTO: 0.05 THOUSANDS/ÂΜL (ref 0–0.1)
BASOPHILS NFR BLD AUTO: 1 % (ref 0–1)
BILIRUB SERPL-MCNC: 0.92 MG/DL (ref 0.2–1)
BUN SERPL-MCNC: 17 MG/DL (ref 5–25)
CALCIUM SERPL-MCNC: 9.2 MG/DL (ref 8.4–10.2)
CHLORIDE SERPL-SCNC: 106 MMOL/L (ref 96–108)
CHOLEST SERPL-MCNC: 202 MG/DL (ref ?–200)
CO2 SERPL-SCNC: 28 MMOL/L (ref 21–32)
CREAT SERPL-MCNC: 0.89 MG/DL (ref 0.6–1.3)
EOSINOPHIL # BLD AUTO: 0.08 THOUSAND/ÂΜL (ref 0–0.61)
EOSINOPHIL NFR BLD AUTO: 1 % (ref 0–6)
ERYTHROCYTE [DISTWIDTH] IN BLOOD BY AUTOMATED COUNT: 12.8 % (ref 11.6–15.1)
EST. AVERAGE GLUCOSE BLD GHB EST-MCNC: 120 MG/DL
GFR SERPL CREATININE-BSD FRML MDRD: 100 ML/MIN/1.73SQ M
GLUCOSE P FAST SERPL-MCNC: 108 MG/DL (ref 65–99)
HBA1C MFR BLD: 5.8 %
HCT VFR BLD AUTO: 46.8 % (ref 36.5–49.3)
HDLC SERPL-MCNC: 48 MG/DL
HGB BLD-MCNC: 15.9 G/DL (ref 12–17)
IMM GRANULOCYTES # BLD AUTO: 0.01 THOUSAND/UL (ref 0–0.2)
IMM GRANULOCYTES NFR BLD AUTO: 0 % (ref 0–2)
LDLC SERPL CALC-MCNC: 134 MG/DL (ref 0–100)
LYMPHOCYTES # BLD AUTO: 0.97 THOUSANDS/ÂΜL (ref 0.6–4.47)
LYMPHOCYTES NFR BLD AUTO: 16 % (ref 14–44)
MCH RBC QN AUTO: 28.9 PG (ref 26.8–34.3)
MCHC RBC AUTO-ENTMCNC: 34 G/DL (ref 31.4–37.4)
MCV RBC AUTO: 85 FL (ref 82–98)
MONOCYTES # BLD AUTO: 0.55 THOUSAND/ÂΜL (ref 0.17–1.22)
MONOCYTES NFR BLD AUTO: 9 % (ref 4–12)
NEUTROPHILS # BLD AUTO: 4.42 THOUSANDS/ÂΜL (ref 1.85–7.62)
NEUTS SEG NFR BLD AUTO: 73 % (ref 43–75)
NRBC BLD AUTO-RTO: 0 /100 WBCS
PLATELET # BLD AUTO: 255 THOUSANDS/UL (ref 149–390)
PMV BLD AUTO: 9.3 FL (ref 8.9–12.7)
POTASSIUM SERPL-SCNC: 4.2 MMOL/L (ref 3.5–5.3)
PROT SERPL-MCNC: 6.8 G/DL (ref 6.4–8.4)
PSA FREE MFR SERPL: 17.43 %
PSA FREE SERPL-MCNC: 0.13 NG/ML
PSA SERPL-MCNC: 0.72 NG/ML (ref 0–4)
RBC # BLD AUTO: 5.5 MILLION/UL (ref 3.88–5.62)
SODIUM SERPL-SCNC: 140 MMOL/L (ref 135–147)
TRIGL SERPL-MCNC: 100 MG/DL (ref ?–150)
TSH SERPL DL<=0.05 MIU/L-ACNC: 2.24 UIU/ML (ref 0.45–4.5)
WBC # BLD AUTO: 6.08 THOUSAND/UL (ref 4.31–10.16)

## 2025-03-13 PROCEDURE — 84403 ASSAY OF TOTAL TESTOSTERONE: CPT

## 2025-03-13 PROCEDURE — 80061 LIPID PANEL: CPT

## 2025-03-13 PROCEDURE — 72052 X-RAY EXAM NECK SPINE 6/>VWS: CPT

## 2025-03-13 PROCEDURE — 84154 ASSAY OF PSA FREE: CPT

## 2025-03-13 PROCEDURE — 84402 ASSAY OF FREE TESTOSTERONE: CPT

## 2025-03-13 PROCEDURE — 84153 ASSAY OF PSA TOTAL: CPT

## 2025-03-13 PROCEDURE — 85025 COMPLETE CBC W/AUTO DIFF WBC: CPT

## 2025-03-13 PROCEDURE — 80053 COMPREHEN METABOLIC PANEL: CPT

## 2025-03-13 PROCEDURE — 36415 COLL VENOUS BLD VENIPUNCTURE: CPT

## 2025-03-13 PROCEDURE — 83036 HEMOGLOBIN GLYCOSYLATED A1C: CPT

## 2025-03-13 PROCEDURE — 84443 ASSAY THYROID STIM HORMONE: CPT

## 2025-03-14 LAB
TESTOST FREE SERPL-MCNC: 14.5 PG/ML (ref 6.8–21.5)
TESTOST SERPL-MCNC: 416 NG/DL (ref 264–916)

## 2025-03-24 ENCOUNTER — APPOINTMENT (OUTPATIENT)
Dept: RADIOLOGY | Facility: CLINIC | Age: 50
End: 2025-03-24
Payer: COMMERCIAL

## 2025-03-24 ENCOUNTER — OFFICE VISIT (OUTPATIENT)
Dept: URGENT CARE | Facility: CLINIC | Age: 50
End: 2025-03-24
Payer: COMMERCIAL

## 2025-03-24 VITALS
DIASTOLIC BLOOD PRESSURE: 76 MMHG | BODY MASS INDEX: 37.52 KG/M2 | TEMPERATURE: 98 F | SYSTOLIC BLOOD PRESSURE: 128 MMHG | WEIGHT: 268 LBS | HEART RATE: 62 BPM | OXYGEN SATURATION: 96 % | HEIGHT: 71 IN | RESPIRATION RATE: 18 BRPM

## 2025-03-24 DIAGNOSIS — M79.671 RIGHT FOOT PAIN: Primary | ICD-10-CM

## 2025-03-24 DIAGNOSIS — M79.671 RIGHT FOOT PAIN: ICD-10-CM

## 2025-03-24 PROCEDURE — 73630 X-RAY EXAM OF FOOT: CPT

## 2025-03-24 PROCEDURE — 99214 OFFICE O/P EST MOD 30 MIN: CPT

## 2025-03-24 NOTE — PATIENT INSTRUCTIONS
Tylenol/Ibuprofen as needed for pain  Rest and Elevate  Ice 20 minutes 3-4 times per day for 3 days  Insulate the skin from the ice to prevent frostbite  Follow up with orthopedic if symptoms do not improve     Wear brace, splint or ACE wrap +/- crutches for support (Remove braces and ACE bandages every 3 hours)  Wear shoe arch support for foot injuries (ex: superfeet insoles)

## 2025-03-24 NOTE — PROGRESS NOTES
[facility]        NAME: Eloy Eden is a 49 y.o. male  : 1975    MRN: [id number]  DATE: 2025  TIME: 4:40 PM    Assessment and Plan   Right foot pain [M79.671]  1. Right foot pain  XR foot 3+ vw right        Initial x-ray reading showed no acute osseous abnormality, pending radiology review.  Discussed with patient that no fracture was seen on x-ray, however film will be reviewed by to radiologist.  Ambulatory referral placed to orthopedics in the case that symptoms do not improve over next week.  Advised rest, ice, elevation, ibuprofen/Tylenol use while at home.  Also recommended orthotic insert for work boot as a potential prophylaxis for future aggravation/injury.    Patient Instructions   Tylenol/Ibuprofen as needed for pain  Rest and Elevate  Ice 20 minutes 3-4 times per day for 3 days  Insulate the skin from the ice to prevent frostbite  Follow up with orthopedic if symptoms do not improve     Wear brace, splint or ACE wrap +/- crutches for support (Remove braces and ACE bandages every 3 hours)  Wear shoe arch support for foot injuries (ex: superfeet insoles)       Follow up with PCP in 3-5 days.  Proceed to  ER if symptoms worsen.    If tests have been performed at [facility], our office will contact you with results if changes need to be made to the care plan discussed with you at the visit.  [facility]    Chief Complaint     Chief Complaint   Patient presents with    Foot Pain     Right foot pain started 4 days ago. No trauma to the area.          History of Present Illness       49-year-old male presenting with 4 days of right foot pain.  He states Friday he was working and was in and out of his truck frequently wearing his work boots.  He then [place] and noticed when stepping out of his car he was having pain on the ball of his right foot, worsening while walking.  He then noticed some swelling around the base of  the ball of his foot and felt tightness around his first and second toes.  He is denying any bruising, weakness, numbness, tingling.  He denies any trauma to that foot.        Review of Systems   Review of Systems   Constitutional:  Negative for chills and fever.   HENT:  Negative for ear pain and sore throat.    Eyes:  Negative for pain and visual disturbance.   Respiratory:  Negative for cough and shortness of breath.    Cardiovascular:  Negative for chest pain and palpitations.   Gastrointestinal:  Negative for abdominal pain and vomiting.   Genitourinary:  Negative for dysuria and hematuria.   Musculoskeletal:  Positive for arthralgias, gait problem and joint swelling. Negative for back pain.   Skin:  Negative for color change and rash.   Neurological:  Negative for seizures and syncope.   All other systems reviewed and are negative.        Current Medications       Current Outpatient Medications:     cetirizine (ZyrTEC) 10 mg tablet, Take 10 mg by mouth daily, Disp: , Rfl:     lansoprazole (PREVACID) 30 mg capsule, Take 30 mg by mouth daily, Disp: , Rfl:     Naproxen Sodium (ALEVE PO), Take by mouth as needed 200-400mg depending on pain, Disp: , Rfl:     tadalafil (CIALIS) 10 MG tablet, Take 1 tablet (10 mg total) by mouth daily as needed for erectile dysfunction, Disp: 30 tablet, Rfl: 0    vardenafil (LEVITRA) 20 MG tablet, Take 1 tablet (20 mg total) by mouth daily as needed for erectile dysfunction (Patient not taking: Reported on 3/24/2025), Disp: 10 tablet, Rfl: 0    Current Allergies     Allergies as of 03/24/2025    (No Known Allergies)            The following portions of the patient's history were reviewed and updated as appropriate: allergies, current medications, past family history, past medical history, past social history, past surgical history and problem list.     No past medical history on file.    Past Surgical History:   Procedure Laterality Date    KNEE ARTHROSCOPY W/ ACL RECONSTRUCTION  "Bilateral     TONSILLECTOMY         Family History   Problem Relation Age of Onset    Hypertension Paternal Grandmother          Medications have been verified.        Objective   /76   Pulse 62   Temp 98 °F (36.7 °C)   Resp 18   Ht 5' 11\" (1.803 m)   Wt 122 kg (268 lb)   SpO2 96%   BMI 37.38 kg/m²   No LMP for male patient.       Physical Exam     Physical Exam  Constitutional:       General: He is not in acute distress.     Appearance: Normal appearance. He is not ill-appearing.   HENT:      Head: Normocephalic and atraumatic.      Nose: Nose normal.      Mouth/Throat:      Mouth: Mucous membranes are moist.   Cardiovascular:      Rate and Rhythm: Normal rate and regular rhythm.      Pulses: Normal pulses.      Heart sounds: Normal heart sounds.   Pulmonary:      Effort: Pulmonary effort is normal.      Breath sounds: Normal breath sounds.   Abdominal:      General: Abdomen is flat.      Palpations: Abdomen is soft.   Musculoskeletal:      Right ankle: Normal.      Right Achilles Tendon: Normal.      Left ankle: Normal.      Left Achilles Tendon: Normal.      Right foot: Normal range of motion and normal capillary refill. Swelling, tenderness and bony tenderness present. No deformity, prominent metatarsal heads or laceration. Normal pulse.      Left foot: Normal.      Comments: Tenderness to palpation of plantar aspect of distal first and second metatarsal heads.  Increased pain with dorsiflexion of first and second toe.   Skin:     General: Skin is warm and dry.      Capillary Refill: Capillary refill takes less than 2 seconds.   Neurological:      General: No focal deficit present.      Mental Status: He is alert and oriented to person, place, and time.                   "

## 2025-03-26 ENCOUNTER — PROCEDURE VISIT (OUTPATIENT)
Dept: NEUROLOGY | Facility: CLINIC | Age: 50
End: 2025-03-26
Payer: COMMERCIAL

## 2025-03-26 DIAGNOSIS — M79.602 BILATERAL ARM PAIN: ICD-10-CM

## 2025-03-26 DIAGNOSIS — M79.601 BILATERAL ARM PAIN: ICD-10-CM

## 2025-03-26 PROCEDURE — 95912 NRV CNDJ TEST 11-12 STUDIES: CPT | Performed by: PHYSICAL MEDICINE & REHABILITATION

## 2025-03-26 PROCEDURE — 95886 MUSC TEST DONE W/N TEST COMP: CPT | Performed by: PHYSICAL MEDICINE & REHABILITATION

## 2025-03-27 RX ORDER — INDOMETHACIN 50 MG/1
50 CAPSULE ORAL 2 TIMES DAILY WITH MEALS
COMMUNITY
Start: 2025-03-25

## 2025-03-27 RX ORDER — METHYLPREDNISOLONE 4 MG/1
4 TABLET ORAL DAILY
COMMUNITY
Start: 2025-03-25

## 2025-04-01 ENCOUNTER — TELEPHONE (OUTPATIENT)
Dept: FAMILY MEDICINE CLINIC | Facility: CLINIC | Age: 50
End: 2025-04-01

## 2025-04-01 DIAGNOSIS — N52.9 ERECTILE DYSFUNCTION, UNSPECIFIED ERECTILE DYSFUNCTION TYPE: ICD-10-CM

## 2025-04-01 RX ORDER — TADALAFIL 20 MG/1
10 TABLET ORAL DAILY PRN
Qty: 30 TABLET | Refills: 0 | Status: SHIPPED | OUTPATIENT
Start: 2025-04-01

## 2025-04-01 NOTE — TELEPHONE ENCOUNTER
Patient called Rx refill line and would like an increase on his Cialis to 20 mg and would like it sent to [facility] and he would like refills on the Rx so he doesn't have to call so often

## 2025-04-04 ENCOUNTER — TELEPHONE (OUTPATIENT)
Dept: UROLOGY | Facility: MEDICAL CENTER | Age: 50
End: 2025-04-04

## 2025-04-04 ENCOUNTER — OFFICE VISIT (OUTPATIENT)
Dept: UROLOGY | Facility: MEDICAL CENTER | Age: 50
End: 2025-04-04
Payer: COMMERCIAL

## 2025-04-04 VITALS
DIASTOLIC BLOOD PRESSURE: 80 MMHG | HEART RATE: 65 BPM | SYSTOLIC BLOOD PRESSURE: 140 MMHG | HEIGHT: 71 IN | OXYGEN SATURATION: 98 % | WEIGHT: 268 LBS | BODY MASS INDEX: 37.52 KG/M2

## 2025-04-04 DIAGNOSIS — N40.1 BENIGN PROSTATIC HYPERPLASIA WITH URINARY HESITANCY: ICD-10-CM

## 2025-04-04 DIAGNOSIS — R39.11 BENIGN PROSTATIC HYPERPLASIA WITH URINARY HESITANCY: ICD-10-CM

## 2025-04-04 DIAGNOSIS — N32.81 OVERACTIVE BLADDER: ICD-10-CM

## 2025-04-04 DIAGNOSIS — Z30.2 ENCOUNTER FOR STERILIZATION: ICD-10-CM

## 2025-04-04 DIAGNOSIS — N52.1 ERECTILE DYSFUNCTION DUE TO DISEASES CLASSIFIED ELSEWHERE: Primary | ICD-10-CM

## 2025-04-04 LAB
SL AMB  POCT GLUCOSE, UA: NORMAL
SL AMB LEUKOCYTE ESTERASE,UA: NORMAL
SL AMB POCT BILIRUBIN,UA: NORMAL
SL AMB POCT BLOOD,UA: NORMAL
SL AMB POCT CLARITY,UA: CLEAR
SL AMB POCT COLOR,UA: YELLOW
SL AMB POCT KETONES,UA: NORMAL
SL AMB POCT NITRITE,UA: NORMAL
SL AMB POCT PH,UA: 7.5
SL AMB POCT SPECIFIC GRAVITY,UA: 1.02
SL AMB POCT URINE PROTEIN: NORMAL
SL AMB POCT UROBILINOGEN: 0.2

## 2025-04-04 PROCEDURE — 99204 OFFICE O/P NEW MOD 45 MIN: CPT | Performed by: UROLOGY

## 2025-04-04 PROCEDURE — 81003 URINALYSIS AUTO W/O SCOPE: CPT | Performed by: UROLOGY

## 2025-04-04 NOTE — TELEPHONE ENCOUNTER
Patient was seen by Dr. Rodney today.  He is scheduled for cystoscopy and Alprostadil injection 5/29/25 at 1 PM.  RX was faxed to [facility] today.  Pt was told to  the medication on his way to the office.

## 2025-04-04 NOTE — ASSESSMENT & PLAN NOTE
Patient complains of some urinary hesitancy feeling of having to have a bowel movement with voiding with often the feeling going away after voiding.  The patient also has urinary frequency especially during cold weather with nocturia 2-4 times nightly.  Denies gross hematuria and dysuria.  Requests treatment and evaluation of urinary frequency particularly during cold weather-previously on tamsulosin with no positive effect and noting that he felt cloudy with it.  Will consider anticholinergics in future

## 2025-04-04 NOTE — ASSESSMENT & PLAN NOTE
Patient has a history of neck issues and peripheral nerve issues which may impact his ED.  Currently utilizing Cialis 20 mg on demand with variability of response although he is able to have intercourse very often with this.  Discussed injection therapy penile implants and other medications.  Will consider injection therapy trial-prescription for alprostadil given .  T level within acceptable limits  Orders:    POCT urine dip auto non-scope

## 2025-04-04 NOTE — LETTER
2025     Terrance Rutherford MD  [address] [place] [id number]    Patient: Eloy Eden   YOB: 1975   Date of Visit: 2025       Dear Dr. Terrance Rutherford MD:    Thank you for referring Eloy Eden to me for evaluation. Below are my notes for this consultation.    If you have questions, please do not hesitate to call me. I look forward to following your patient along with you.         Sincerely,        Julio Cesar Rodney MD        CC: No Recipients    Julio Cesar Rodney MD  2025 12:18 PM  Sign when Signing Visit  Name: Eloy Eden      : 1975      MRN: [id number]  Encounter Provider: Julio Cesar Rodney MD  Encounter Date: 2025   [facility]  :  Assessment & Plan  Erectile dysfunction due to diseases classified elsewhere  Patient has a history of neck issues and peripheral nerve issues which may impact his ED.  Currently utilizing Cialis 20 mg on demand with variability of response although he is able to have intercourse very often with this.  Discussed injection therapy penile implants and other medications.  Will consider injection therapy trial-prescription for alprostadil given .  T level within acceptable limits  Orders:  •  POCT urine dip auto non-scope    Benign prostatic hyperplasia with urinary hesitancy  Patient complains of some urinary hesitancy feeling of having to have a bowel movement with voiding with often the feeling going away after voiding.  The patient also has urinary frequency especially during cold weather with nocturia 2-4 times nightly.  Denies gross hematuria and dysuria.  Requests treatment and evaluation of urinary frequency particularly during cold weather-previously on tamsulosin with no positive effect and noting that he felt cloudy with it.  Will consider anticholinergics in future       Overactive bladder  As above       Encounter for sterilization  Interested in vasectomy.  Procedure  explained to him risks and complications explained as well in detail including bleeding infection recanalization persistent recurrent fertility atrophy chronic pain.  The patient agrees to the procedure and we will schedule vasectomy in the future.             History of Present Illness  Eloy Eden is a 49 y.o. male who presents complaints of erectile dysfunction voiding dysfunction including urinary hesitancy weakening of the urinary stream and overactive bladder symptoms of frequency and urgency with nocturia.  He is also interested in elective sterilization using vasectomy.  The patient presents to discuss this.  Greater than 50% of the visit was counseling with both the patient and his significant other via telephone.  This took approximately 1 hour.  Plans as below.  The patient also complains of neck pain and was concerned that there may be a neurologic issue causing his erectile dysfunction as well as variability in the urinary stream.  As far as his ED he cannot get erections on his own utilizes Cialis 20 mg with occasional good erections but also difficulty obtaining the erection or maintaining it at various times.  He is interested in injection therapy as well as treatment for his voiding pattern and for vasectomy.  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  There is no surgical fixation done.  He does note every once in a while that testis will recede into his groin.  This is clearly retractile but not truly cryptorchid  AUA SYMPTOM SCORE      Flowsheet Row Most Recent Value   AUA SYMPTOM SCORE    How often have you had a sensation of not emptying your bladder completely after you finished urinating? 3   How often have you had to urinate again less than two hours after you finished urinating? 3   How often have you found you stopped and started again several times when you urinate? 2   How often have you found it difficult to postpone  urination? 4   How often have you had a weak urinary stream? 2   How often have you had to push or strain to begin urination? 0   How many times did you most typically get up to urinate from the time you went to bed at night until the time you got up in the morning? 2   Quality of Life: If you were to spend the rest of your life with your urinary condition just the way it is now, how would you feel about that? 4   AUA SYMPTOM SCORE 16          Review of Systems   Genitourinary:  Positive for difficulty urinating, frequency, testicular pain and urgency.   Musculoskeletal:  Positive for arthralgias, myalgias and neck pain.   All other systems reviewed and are negative.    Pertinent Medical History            Medical History Reviewed by provider this encounter:  Tobacco  Allergies  Meds  Problems  Med Hx  Surg Hx  Fam Hx  Soc   Hx    .  Past Medical History  History reviewed. No pertinent past medical history.  Past Surgical History:   Procedure Laterality Date   • KNEE ARTHROSCOPY W/ ACL RECONSTRUCTION Bilateral    • TONSILLECTOMY       Family History   Problem Relation Age of Onset   • Hypertension Paternal Grandmother       reports that he has quit smoking. His smoking use included cigarettes. He has never been exposed to tobacco smoke. He has never used smokeless tobacco. He reports current alcohol use of about 2.0 standard drinks of alcohol per week. He reports that he does not use drugs.  Current Outpatient Medications   Medication Instructions   • cetirizine (ZYRTEC) 10 mg, Daily   • indomethacin (INDOCIN) 50 mg, 2 times daily with meals   • lansoprazole (PREVACID) 30 mg, Daily   • methylPREDNISolone 4 mg, Daily   • Naproxen Sodium (ALEVE PO) As needed   • tadalafil (CIALIS) 10 mg, Oral, Daily PRN   • vardenafil (LEVITRA) 20 mg, Oral, Daily PRN     Allergies   Allergen Reactions   • Pollen Extract Allergic Rhinitis      Current Outpatient Medications on File Prior to Visit   Medication Sig Dispense Refill  "  • cetirizine (ZyrTEC) 10 mg tablet Take 10 mg by mouth daily     • indomethacin (INDOCIN) 50 mg capsule Take 50 mg by mouth 2 (two) times a day with meals     • lansoprazole (PREVACID) 30 mg capsule Take 30 mg by mouth daily     • methylPREDNISolone 4 MG tablet therapy pack Take 4 mg by mouth daily     • Naproxen Sodium (ALEVE PO) Take by mouth as needed 200-400mg depending on pain     • tadalafil (CIALIS) 20 MG tablet Take 0.5 tablets (10 mg total) by mouth daily as needed for erectile dysfunction 30 tablet 0   • vardenafil (LEVITRA) 20 MG tablet Take 1 tablet (20 mg total) by mouth daily as needed for erectile dysfunction (Patient not taking: Reported on 4/4/2025) 10 tablet 0     No current facility-administered medications on file prior to visit.      Social History     Tobacco Use   • Smoking status: Former     Types: Cigarettes     Passive exposure: Never   • Smokeless tobacco: Never   Vaping Use   • Vaping status: Never Used   Substance and Sexual Activity   • Alcohol use: Yes     Alcohol/week: 2.0 standard drinks of alcohol     Types: 1 Cans of beer, 1 Shots of liquor per week     Comment: daily   • Drug use: Never   • Sexual activity: Not on file        Objective  /80 (BP Location: Left arm, Patient Position: Sitting, Cuff Size: Standard)   Pulse 65   Ht 5' 11\" (1.803 m)   Wt 122 kg (268 lb)   SpO2 98%   BMI 37.38 kg/m²     Physical Exam  Vitals reviewed.   Constitutional:       General: He is not in acute distress.     Appearance: Normal appearance. He is obese. He is not ill-appearing, toxic-appearing or diaphoretic.   HENT:      Head: Normocephalic and atraumatic.      Nose: Nose normal.      Mouth/Throat:      Mouth: Mucous membranes are moist.   Eyes:      Extraocular Movements: Extraocular movements intact.   Pulmonary:      Effort: Pulmonary effort is normal. No respiratory distress.   Abdominal:      General: There is no distension.      Palpations: Abdomen is soft.      Tenderness: " There is no abdominal tenderness. There is no guarding.   Genitourinary:     Penis: Normal.       Testes: Normal.      Prostate: Normal.      Rectum: Normal.      Comments: Retractile left testis not atrophic  Musculoskeletal:         General: Normal range of motion.      Cervical back: Neck supple.   Skin:     General: Skin is dry.   Neurological:      Mental Status: He is alert and oriented to person, place, and time.   Psychiatric:         Mood and Affect: Mood normal.         Behavior: Behavior normal.         Thought Content: Thought content normal.         Judgment: Judgment normal.            Results   Lab Results   Component Value Date    PSA 0.723 03/13/2025    PSA 0.32 12/08/2023     Lab Results   Component Value Date    CALCIUM 9.2 03/13/2025    K 4.2 03/13/2025    CO2 28 03/13/2025     03/13/2025    BUN 17 03/13/2025    CREATININE 0.89 03/13/2025     Lab Results   Component Value Date    WBC 6.08 03/13/2025    HGB 15.9 03/13/2025    HCT 46.8 03/13/2025    MCV 85 03/13/2025     03/13/2025       Office Urine Dip  Recent Results (from the past hour)   POCT urine dip auto non-scope    Collection Time: 04/04/25 11:47 AM   Result Value Ref Range     COLOR,UA yellow     CLARITY,UA clear     SPECIFIC GRAVITY,UA 1.020      PH,UA 7.5     LEUKOCYTE ESTERASE,UA neg     NITRITE,UA neg     GLUCOSE, UA neg     KETONES,UA neg     BILIRUBIN,UA neg     BLOOD,UA neg     POCT URINE PROTEIN neg     SL AMB POCT UROBILINOGEN 0.2

## 2025-04-04 NOTE — PROGRESS NOTES
Name: Eloy Eden      : 1975      MRN: [id number]  Encounter Provider: Julio Cesar Rodney MD  Encounter Date: 2025   Encounter department: [facility] [place]  :  Assessment & Plan  Erectile dysfunction due to diseases classified elsewhere  Patient has a history of neck issues and peripheral nerve issues which may impact his ED.  Currently utilizing Cialis 20 mg on demand with variability of response although he is able to have intercourse very often with this.  Discussed injection therapy penile implants and other medications.  Will consider injection therapy trial-prescription for alprostadil given .  T level within acceptable limits  Orders:    POCT urine dip auto non-scope    Benign prostatic hyperplasia with urinary hesitancy  Patient complains of some urinary hesitancy feeling of having to have a bowel movement with voiding with often the feeling going away after voiding.  The patient also has urinary frequency especially during cold weather with nocturia 2-4 times nightly.  Denies gross hematuria and dysuria.  Requests treatment and evaluation of urinary frequency particularly during cold weather-previously on tamsulosin with no positive effect and noting that he felt cloudy with it.  Will consider anticholinergics in future       Overactive bladder  As above       Encounter for sterilization  Interested in vasectomy.  Procedure explained to him risks and complications explained as well in detail including bleeding infection recanalization persistent recurrent fertility atrophy chronic pain.  The patient agrees to the procedure and we will schedule vasectomy in the future.             History of Present Illness   Eloy Eden is a 49 y.o. male who presents complaints of erectile dysfunction voiding dysfunction including urinary hesitancy weakening of the urinary stream and overactive bladder symptoms of frequency and urgency with nocturia.  He is also interested in  elective sterilization using vasectomy.  The patient presents to discuss this.  Greater than 50% of the visit was counseling with both the patient and his significant other via telephone.  This took approximately 1 hour.  Plans as below.  The patient also complains of neck pain and was concerned that there may be a neurologic issue causing his erectile dysfunction as well as variability in the urinary stream.  As far as his ED he cannot get erections on his own utilizes Cialis 20 mg with occasional good erections but also difficulty obtaining the erection or maintaining it at various times.  He is interested in injection therapy as well as treatment for his voiding pattern and for vasectomy.  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  There is no surgical fixation done.  He does note every once in a while that testis will recede into his groin.  This is clearly retractile but not truly cryptorchid  AUA SYMPTOM SCORE      Flowsheet Row Most Recent Value   AUA SYMPTOM SCORE    How often have you had a sensation of not emptying your bladder completely after you finished urinating? 3   How often have you had to urinate again less than two hours after you finished urinating? 3   How often have you found you stopped and started again several times when you urinate? 2   How often have you found it difficult to postpone urination? 4   How often have you had a weak urinary stream? 2   How often have you had to push or strain to begin urination? 0   How many times did you most typically get up to urinate from the time you went to bed at night until the time you got up in the morning? 2   Quality of Life: If you were to spend the rest of your life with your urinary condition just the way it is now, how would you feel about that? 4   AUA SYMPTOM SCORE 16          Review of Systems   Genitourinary:  Positive for difficulty urinating, frequency, testicular pain and  urgency.   Musculoskeletal:  Positive for arthralgias, myalgias and neck pain.   All other systems reviewed and are negative.    Pertinent Medical History             Medical History Reviewed by provider this encounter:  Tobacco  Allergies  Meds  Problems  Med Hx  Surg Hx  Fam Hx  Soc   Hx    .  Past Medical History   History reviewed. No pertinent past medical history.  Past Surgical History:   Procedure Laterality Date    KNEE ARTHROSCOPY W/ ACL RECONSTRUCTION Bilateral     TONSILLECTOMY       Family History   Problem Relation Age of Onset    Hypertension Paternal Grandmother       reports that he has quit smoking. His smoking use included cigarettes. He has never been exposed to tobacco smoke. He has never used smokeless tobacco. He reports current alcohol use of about 2.0 standard drinks of alcohol per week. He reports that he does not use drugs.  Current Outpatient Medications   Medication Instructions    cetirizine (ZYRTEC) 10 mg, Daily    indomethacin (INDOCIN) 50 mg, 2 times daily with meals    lansoprazole (PREVACID) 30 mg, Daily    methylPREDNISolone 4 mg, Daily    Naproxen Sodium (ALEVE PO) As needed    tadalafil (CIALIS) 10 mg, Oral, Daily PRN    vardenafil (LEVITRA) 20 mg, Oral, Daily PRN     Allergies   Allergen Reactions    Pollen Extract Allergic Rhinitis      Current Outpatient Medications on File Prior to Visit   Medication Sig Dispense Refill    cetirizine (ZyrTEC) 10 mg tablet Take 10 mg by mouth daily      indomethacin (INDOCIN) 50 mg capsule Take 50 mg by mouth 2 (two) times a day with meals      lansoprazole (PREVACID) 30 mg capsule Take 30 mg by mouth daily      methylPREDNISolone 4 MG tablet therapy pack Take 4 mg by mouth daily      Naproxen Sodium (ALEVE PO) Take by mouth as needed 200-400mg depending on pain      tadalafil (CIALIS) 20 MG tablet Take 0.5 tablets (10 mg total) by mouth daily as needed for erectile dysfunction 30 tablet 0    vardenafil (LEVITRA) 20 MG tablet Take 1  "tablet (20 mg total) by mouth daily as needed for erectile dysfunction (Patient not taking: Reported on 4/4/2025) 10 tablet 0     No current facility-administered medications on file prior to visit.      Social History     Tobacco Use    Smoking status: Former     Types: Cigarettes     Passive exposure: Never    Smokeless tobacco: Never   Vaping Use    Vaping status: Never Used   Substance and Sexual Activity    Alcohol use: Yes     Alcohol/week: 2.0 standard drinks of alcohol     Types: 1 Cans of beer, 1 Shots of liquor per week     Comment: daily    Drug use: Never    Sexual activity: Not on file        Objective   /80 (BP Location: Left arm, Patient Position: Sitting, Cuff Size: Standard)   Pulse 65   Ht 5' 11\" (1.803 m)   Wt 122 kg (268 lb)   SpO2 98%   BMI 37.38 kg/m²     Physical Exam  Vitals reviewed.   Constitutional:       General: He is not in acute distress.     Appearance: Normal appearance. He is obese. He is not ill-appearing, toxic-appearing or diaphoretic.   HENT:      Head: Normocephalic and atraumatic.      Nose: Nose normal.      Mouth/Throat:      Mouth: Mucous membranes are moist.   Eyes:      Extraocular Movements: Extraocular movements intact.   Pulmonary:      Effort: Pulmonary effort is normal. No respiratory distress.   Abdominal:      General: There is no distension.      Palpations: Abdomen is soft.      Tenderness: There is no abdominal tenderness. There is no guarding.   Genitourinary:     Penis: Normal.       Testes: Normal.      Prostate: Normal.      Rectum: Normal.      Comments: Retractile left testis not atrophic  Musculoskeletal:         General: Normal range of motion.      Cervical back: Neck supple.   Skin:     General: Skin is dry.   Neurological:      Mental Status: He is alert and oriented to person, place, and time.   Psychiatric:         Mood and Affect: Mood normal.         Behavior: Behavior normal.         Thought Content: Thought content normal.         " Judgment: Judgment normal.            Results   Lab Results   Component Value Date    PSA 0.723 03/13/2025    PSA 0.32 12/08/2023     Lab Results   Component Value Date    CALCIUM 9.2 03/13/2025    K 4.2 03/13/2025    CO2 28 03/13/2025     03/13/2025    BUN 17 03/13/2025    CREATININE 0.89 03/13/2025     Lab Results   Component Value Date    WBC 6.08 03/13/2025    HGB 15.9 03/13/2025    HCT 46.8 03/13/2025    MCV 85 03/13/2025     03/13/2025       Office Urine Dip  Recent Results (from the past hour)   POCT urine dip auto non-scope    Collection Time: 04/04/25 11:47 AM   Result Value Ref Range     COLOR,UA yellow     CLARITY,UA clear     SPECIFIC GRAVITY,UA 1.020      PH,UA 7.5     LEUKOCYTE ESTERASE,UA neg     NITRITE,UA neg     GLUCOSE, UA neg     KETONES,UA neg     BILIRUBIN,UA neg     BLOOD,UA neg     POCT URINE PROTEIN neg     SL AMB POCT UROBILINOGEN 0.2

## 2025-04-04 NOTE — TELEPHONE ENCOUNTER
Patient was wondering if there was anyway to potentially get in sooner. He was added to the wait list.

## 2025-04-18 ENCOUNTER — OFFICE VISIT (OUTPATIENT)
Dept: NEUROSURGERY | Facility: CLINIC | Age: 50
End: 2025-04-18
Payer: COMMERCIAL

## 2025-04-18 VITALS
BODY MASS INDEX: 37.52 KG/M2 | WEIGHT: 268 LBS | SYSTOLIC BLOOD PRESSURE: 106 MMHG | TEMPERATURE: 97.6 F | HEART RATE: 69 BPM | RESPIRATION RATE: 18 BRPM | DIASTOLIC BLOOD PRESSURE: 62 MMHG | HEIGHT: 71 IN | OXYGEN SATURATION: 96 %

## 2025-04-18 DIAGNOSIS — M79.602 BILATERAL ARM PAIN: ICD-10-CM

## 2025-04-18 DIAGNOSIS — G89.29 CHRONIC NECK PAIN: ICD-10-CM

## 2025-04-18 DIAGNOSIS — M50.30 OTHER CERVICAL DISC DEGENERATION, UNSPECIFIED CERVICAL REGION: Primary | ICD-10-CM

## 2025-04-18 DIAGNOSIS — M79.601 BILATERAL ARM PAIN: ICD-10-CM

## 2025-04-18 DIAGNOSIS — M54.2 CHRONIC NECK PAIN: ICD-10-CM

## 2025-04-18 PROCEDURE — 99213 OFFICE O/P EST LOW 20 MIN: CPT | Performed by: NEUROLOGICAL SURGERY

## 2025-04-18 NOTE — PROGRESS NOTES
Name: Eloy Eden      : 1975      MRN: [id number]  Encounter Provider: Demarcus Li MD  Encounter Date: 2025   Encounter department: [facility] [place]  :  Assessment & Plan  Other cervical disc degeneration, unspecified cervical region    Orders:  •  Ambulatory referral to Spine & Pain Management; Future    Chronic neck pain    Orders:  •  Ambulatory referral to Spine & Pain Management; Future    Bilateral arm pain           49-year-old male who reports multiple years of neck and arm symptoms.  He describes his neck pain is bilateral in the posterior aspect, into his trapezius bilaterally to the shoulders and sometimes interscapular.  Severity 5/10.  He also has concerns that when he had a significant gag episode, he had shooting pins needles and numbness into his bilateral arms involving entire arm and hands and fingers and his arms and his arms felt dead for a minute or 2.  He describes numbness and tingling in his 3rd through 5th fingers bilaterally, triggered by turning his head. This has been present for some time.  Some issues with his bladder, urgency and some incontinence but no obvious retention or hesitancy.     On exam full strength IO, , wrist extensor, biceps and deltoids bilaterally.  No obvious Cynthia's or clonus.  Positive Tinel's at the elbow bilaterally.     MRI scan cervical spine shows some straightening if not mild reversal of his cervical lordosis.  There is some mild stenosis at C4-5 with perhaps some foraminal stenosis but no ananth cord compression, myelomalacia etc. His flexion-extension x-rays did not show any profound instability.  They do report very mild listhesis C3-4, C4-5, C5-6 these are trace.     The numbness in his fingers 3 through 5 bilaterally is most consistent with cubital tunnel syndrome.  We did have him go for an EMG which did not diagnose this, although in my discussions with our hand surgeon colleagues, they feel  the sensitivity for EMG is about 50%. I cannot equate the numbness symptoms from his cervical spine disease.  I counseled him on conservative measures such as behavioral modifications, perhaps bracing, and ultimately the option of surgical decompression if necessary.  I counseled him on worsening symptoms such as hand weakness.  I have offered him referral to orthopedics to discuss cubital tunnel decompression, but we have deferred for now     It is possible he may have some C5 radicular symptoms, and that is his worst cervical level, but stenosis is not profound.  EMG suggested chronic C5-6 radiculopathy bilaterally.  We talked about various options for management.  His primary concern is his neck stiffness, in my experience surgery often is not particularly rewarding for axial neck pain.  He did get good neck pain relief from physical therapy and traction and I have encouraged him to continue that either formally or through home exercise program. I suggested pain management, which we deferred previously, but he excepted a referral today, and will engage with him at his discretion.  I suggested should his shooting arm pain become more frequent, consistent, and debilitating, surgery for his cervical spine may be more rewarding.  He appreciated this, and is satisfied.  Will see him back on an as-needed basis.    The patient did have follow-up with urology regarding his urinary issues.     03/07/25 Metrics: EQ5D5L 88233=4174; VAS 65; MJOA 13/17 04/18/25 Metrics: EQ5D5L 45126=8.800; VAS 65; mJOA 13/17.     History of Present Illness     Eloy Eden is a 49 y.o. male who presents for follow-up    HPI     Pain Score:   5 - neck      Review of Systems   HENT:  Negative for trouble swallowing.    Genitourinary:  Negative for enuresis.   Musculoskeletal:  Positive for neck pain (neck pain into BUE) and neck stiffness (lower rom). Negative for gait problem and myalgias.   Neurological:  Positive for weakness (BUE) and  "numbness (b/l hands adn last 3 digits, intermittent into arm).   Hematological:  Does not bruise/bleed easily.   All other systems reviewed and are negative.    I have personally reviewed the MA's review of systems and made changes as necessary.    Past Medical History   History reviewed. No pertinent past medical history.  Past Surgical History:   Procedure Laterality Date   • KNEE ARTHROSCOPY W/ ACL RECONSTRUCTION Bilateral    • TONSILLECTOMY       Family History   Problem Relation Age of Onset   • Hypertension Paternal Grandmother      he reports that he has quit smoking. His smoking use included cigarettes. He has never been exposed to tobacco smoke. He has never used smokeless tobacco. He reports current alcohol use of about 2.0 standard drinks of alcohol per week. He reports that he does not use drugs.  Current Outpatient Medications   Medication Instructions   • cetirizine (ZYRTEC) 10 mg, Daily   • indomethacin (INDOCIN) 50 mg, 2 times daily with meals   • lansoprazole (PREVACID) 30 mg, Daily   • methylPREDNISolone 4 mg, Daily   • Naproxen Sodium (ALEVE PO) As needed   • tadalafil (CIALIS) 10 mg, Oral, Daily PRN   • vardenafil (LEVITRA) 20 mg, Oral, Daily PRN     Allergies   Allergen Reactions   • Pollen Extract Allergic Rhinitis      Objective   /62 (BP Location: Left arm, Patient Position: Sitting, Cuff Size: Standard)   Pulse 69   Temp 97.6 °F (36.4 °C) (Temporal)   Resp 18   Ht 5' 11\" (1.803 m)   Wt 122 kg (268 lb)   SpO2 96%   BMI 37.38 kg/m²     Physical Exam  Neurological Exam    Radiology Results Review: I have reviewed the following images/report studies in PACS: F/E CSpine Xrays 3/24/25.    EMG report from study 3/7/25.           "

## 2025-05-27 ENCOUNTER — OFFICE VISIT (OUTPATIENT)
Dept: URGENT CARE | Facility: CLINIC | Age: 50
End: 2025-05-27
Payer: COMMERCIAL

## 2025-05-27 VITALS
TEMPERATURE: 98 F | RESPIRATION RATE: 20 BRPM | SYSTOLIC BLOOD PRESSURE: 142 MMHG | OXYGEN SATURATION: 96 % | HEART RATE: 77 BPM | DIASTOLIC BLOOD PRESSURE: 84 MMHG

## 2025-05-27 DIAGNOSIS — J01.90 ACUTE NON-RECURRENT SINUSITIS, UNSPECIFIED LOCATION: Primary | ICD-10-CM

## 2025-05-27 PROCEDURE — 99213 OFFICE O/P EST LOW 20 MIN: CPT

## 2025-05-27 RX ORDER — ALBUTEROL SULFATE 90 UG/1
2 INHALANT RESPIRATORY (INHALATION) EVERY 6 HOURS PRN
Qty: 8.5 G | Refills: 0 | Status: SHIPPED | OUTPATIENT
Start: 2025-05-27

## 2025-05-27 RX ORDER — AZITHROMYCIN 250 MG/1
TABLET, FILM COATED ORAL
Qty: 6 TABLET | Refills: 0 | Status: SHIPPED | OUTPATIENT
Start: 2025-05-27 | End: 2025-05-31

## 2025-05-27 RX ORDER — GUAIFENESIN 600 MG/1
1200 TABLET, EXTENDED RELEASE ORAL EVERY 12 HOURS SCHEDULED
Qty: 30 TABLET | Refills: 0 | Status: SHIPPED | OUTPATIENT
Start: 2025-05-27

## 2025-05-28 NOTE — PROGRESS NOTES
Name: Eloy Eden      : 1975      MRN: [id number]  Encounter Provider: Mamta Acevedo PA-C  Encounter Date: 2025   Encounter department: [facility]  :  Assessment & Plan  Acute non-recurrent sinusitis, unspecified location    Orders:    azithromycin (ZITHROMAX) 250 mg tablet; Take 2 tablets today then 1 tablet daily x 4 days    guaiFENesin (MUCINEX) 600 mg 12 hr tablet; Take 2 tablets (1,200 mg total) by mouth every 12 (twelve) hours    albuterol (ProAir HFA) 90 mcg/act inhaler; Inhale 2 puffs every 6 (six) hours as needed for wheezing    Mucus management and antibiotic sent for symptoms.  Also given refill on albuterol inhaler and patient advised that he should keep on hand given this is a recurrent issue for him.  Patient knows he should follow-up with family doctor if current treatment does not manage take care of symptoms.    History of Present Illness   Cough  Associated symptoms include a sore throat.   Sore Throat   Associated symptoms include coughing.     Eloy Eden is a 49 y.o. male who presents for concern of sinus infection progressing into bronchitis.  Patient endorses a long history of recurrent bronchitis this time of year and wants to be on top of this before things get worse.          Review of Systems   HENT:  Positive for sore throat.    Respiratory:  Positive for cough.           Objective   /84   Pulse 77   Temp 98 °F (36.7 °C)   Resp 20   SpO2 96%      Physical Exam  Constitutional:       Appearance: He is well-developed. He is ill-appearing.   HENT:      Head: Normocephalic and atraumatic.      Right Ear: Ear canal normal. A middle ear effusion is present.      Left Ear: Ear canal normal. A middle ear effusion is present.      Nose: Congestion present.      Mouth/Throat:      Mouth: Mucous membranes are moist.     Cardiovascular:      Rate and Rhythm: Normal rate.   Pulmonary:      Effort: Pulmonary effort is normal.     Skin:      General: Skin is warm and dry.     Neurological:      Mental Status: He is alert.

## 2025-05-29 ENCOUNTER — PROCEDURE VISIT (OUTPATIENT)
Dept: UROLOGY | Facility: MEDICAL CENTER | Age: 50
End: 2025-05-29
Payer: COMMERCIAL

## 2025-05-29 VITALS
HEART RATE: 72 BPM | BODY MASS INDEX: 33.6 KG/M2 | OXYGEN SATURATION: 96 % | HEIGHT: 71 IN | SYSTOLIC BLOOD PRESSURE: 128 MMHG | WEIGHT: 240 LBS | DIASTOLIC BLOOD PRESSURE: 74 MMHG

## 2025-05-29 DIAGNOSIS — Z30.2 ENCOUNTER FOR STERILIZATION: Primary | ICD-10-CM

## 2025-05-29 PROCEDURE — 88302 TISSUE EXAM BY PATHOLOGIST: CPT | Performed by: STUDENT IN AN ORGANIZED HEALTH CARE EDUCATION/TRAINING PROGRAM

## 2025-05-29 PROCEDURE — 55250 REMOVAL OF SPERM DUCT(S): CPT | Performed by: UROLOGY

## 2025-05-29 NOTE — LETTER
May 29, 2025     Terrance Rutherford MD  [address] [place] [id number]    Patient: Eloy Eden   YOB: 1975   Date of Visit: 5/29/2025       Dear Dr. Terrance Rutherford MD:    Thank you for referring Eloy Eden to me for evaluation. Below are my notes for this consultation.    If you have questions, please do not hesitate to call me. I look forward to following your patient along with you.         Sincerely,        Julio Cesar Rodney MD        CC: No Recipients

## 2025-05-29 NOTE — PROGRESS NOTES
"    Vasectomy     Date/Time  5/29/2025 1:00 PM     Performed by  Julio Cesar Rodney MD   Authorized by  Julio Cesar Rodney MD     [place] Protocol   procedure performed by consultantConsent: Verbal consent obtained. Written consent obtained  Risks and benefits: risks, benefits and alternatives were discussed  Consent given by: patient  Time out: Immediately prior to procedure a \"time out\" was called to verify the correct patient, procedure, equipment, support staff and site/side marked as required.  Patient understanding: patient states understanding of the procedure being performed  Patient consent: the patient's understanding of the procedure matches consent given  Procedure consent: procedure consent matches procedure scheduled  Required items: required blood products, implants, devices, and special equipment available  Patient identity confirmed: verbally with patient      Local anesthesia used: yes     Anesthesia   Local anesthesia used: yes  Local Anesthetic: lidocaine 2% without epinephrine  Anesthetic total: 10 mL     Sedation   Patient sedated: no        Specimen: yes    Culture: no   Procedure Details   Procedure Notes: I discussed the proposed procedure with the patient step-by-step answered all patient questions and performed physical examination.  Both vasa were identified by palpation with respect to the left and right spermatic cords.  The patient reaffirmed previously signed informed consent verbally.  Risks and complications including failed vasectomy bleeding infection fluid collection need for additional operative procedures was discussed with the patient in detail.  With the patient in supine position a rubber band was placed on the penis and clipped to the patient's shirt for retraction in a cephalad direction.  Betadine was used to prep the genital.  Lidocaine 2% without epinephrine was used to raise a anterior scrotal wheal in the midline just below the penoscrotal junction.  Further around " the vas deferens on the left and right spermatic cords for spermatic cord blocks.  A puncture wound was made in the anterior scrotal surface with the sharp hemostat and the cervical clamp used to grab first the right and then the left vas deferens bringing the vas deferens to the surface of the puncture wound lower abdomen tissue was sharply cleaned off and segments of the vas on the left and on the right were obtained in a length of approximately 0.5 cm.  Intraluminal fulguration then took place on all 4 free ends and the up and adventitia was brought over the abdominal cut end of the vas deferens bilaterally with a surgical clip placed to provide tissue interposition.  There is no bleeding.  A Band-Aid was placed on the puncture wound and the patient recovered uneventfully.  The patient will contact us if there are any postoperative problems otherwise he will maintain contraception for 8 weeks obtain a sperm count if the sperm count is 0 will discontinue contraception.

## 2025-06-02 PROCEDURE — 88302 TISSUE EXAM BY PATHOLOGIST: CPT | Performed by: STUDENT IN AN ORGANIZED HEALTH CARE EDUCATION/TRAINING PROGRAM

## 2025-07-18 ENCOUNTER — TELEPHONE (OUTPATIENT)
Age: 50
End: 2025-07-18

## 2025-07-18 NOTE — TELEPHONE ENCOUNTER
Patient called stating he had a Vasectomy in May and he is to collect semen specimen and wanted to know what number to call. Number for CS provided and conneted

## 2025-07-23 ENCOUNTER — APPOINTMENT (OUTPATIENT)
Dept: LAB | Facility: HOSPITAL | Age: 50
End: 2025-07-23
Attending: UROLOGY
Payer: COMMERCIAL

## 2025-07-23 ENCOUNTER — RESULTS FOLLOW-UP (OUTPATIENT)
Dept: OTHER | Facility: HOSPITAL | Age: 50
End: 2025-07-23

## 2025-07-23 DIAGNOSIS — Z30.2 ENCOUNTER FOR STERILIZATION: ICD-10-CM

## 2025-07-23 LAB
DEPRECATED CD4 CELLS/CD8 CELLS BLD: 1.5 ML
SPERM MOTILE SMN QL MICRO: ABNORMAL

## 2025-07-23 PROCEDURE — 89321 SEMEN ANAL SPERM DETECTION: CPT

## 2025-07-23 NOTE — TELEPHONE ENCOUNTER
LVM informing patient he may discontinue the use of birth control after review of post vasectomy semen analysis by Dr. Rodney.  [facility] office #[phone].